# Patient Record
Sex: FEMALE | Race: WHITE | Employment: PART TIME | URBAN - METROPOLITAN AREA
[De-identification: names, ages, dates, MRNs, and addresses within clinical notes are randomized per-mention and may not be internally consistent; named-entity substitution may affect disease eponyms.]

---

## 2017-03-08 ENCOUNTER — OFFICE VISIT (OUTPATIENT)
Dept: ENDOCRINOLOGY | Age: 32
End: 2017-03-08

## 2017-03-08 VITALS
SYSTOLIC BLOOD PRESSURE: 110 MMHG | DIASTOLIC BLOOD PRESSURE: 68 MMHG | HEART RATE: 68 BPM | WEIGHT: 142.6 LBS | HEIGHT: 64 IN | BODY MASS INDEX: 24.34 KG/M2 | OXYGEN SATURATION: 98 %

## 2017-03-08 DIAGNOSIS — R53.82 CHRONIC FATIGUE: ICD-10-CM

## 2017-03-08 DIAGNOSIS — E03.8 HYPOTHYROIDISM DUE TO HASHIMOTO'S THYROIDITIS: Primary | ICD-10-CM

## 2017-03-08 DIAGNOSIS — E06.3 HYPOTHYROIDISM DUE TO HASHIMOTO'S THYROIDITIS: Primary | ICD-10-CM

## 2017-03-08 DIAGNOSIS — G43.009 MIGRAINE WITHOUT AURA AND WITHOUT STATUS MIGRAINOSUS, NOT INTRACTABLE: ICD-10-CM

## 2017-03-08 PROCEDURE — 99213 OFFICE O/P EST LOW 20 MIN: CPT | Performed by: INTERNAL MEDICINE

## 2017-03-08 RX ORDER — LIOTHYRONINE SODIUM 5 UG/1
5 TABLET ORAL DAILY
Qty: 30 TABLET | Refills: 3 | Status: SHIPPED | OUTPATIENT
Start: 2017-03-08 | End: 2017-05-31 | Stop reason: SDUPTHER

## 2017-03-08 ASSESSMENT — PATIENT HEALTH QUESTIONNAIRE - PHQ9
2. FEELING DOWN, DEPRESSED OR HOPELESS: 0
SUM OF ALL RESPONSES TO PHQ9 QUESTIONS 1 & 2: 0
SUM OF ALL RESPONSES TO PHQ QUESTIONS 1-9: 0
1. LITTLE INTEREST OR PLEASURE IN DOING THINGS: 0

## 2017-03-16 DIAGNOSIS — E06.3 HYPOTHYROIDISM DUE TO HASHIMOTO'S THYROIDITIS: ICD-10-CM

## 2017-03-16 DIAGNOSIS — G43.009 MIGRAINE WITHOUT AURA AND WITHOUT STATUS MIGRAINOSUS, NOT INTRACTABLE: ICD-10-CM

## 2017-03-16 DIAGNOSIS — R53.82 CHRONIC FATIGUE: ICD-10-CM

## 2017-03-16 DIAGNOSIS — E03.8 HYPOTHYROIDISM DUE TO HASHIMOTO'S THYROIDITIS: ICD-10-CM

## 2017-03-20 LAB
CORTISOL SALIVARY: 0.04 UG/DL
CORTISOL SALIVARY: 0.06 UG/DL
CORTISOL SALIVARY: 0.13 UG/DL
CORTISOL SALIVARY: 0.23 UG/DL

## 2017-05-04 DIAGNOSIS — R53.82 CHRONIC FATIGUE: ICD-10-CM

## 2017-05-04 DIAGNOSIS — G43.009 MIGRAINE WITHOUT AURA AND WITHOUT STATUS MIGRAINOSUS, NOT INTRACTABLE: ICD-10-CM

## 2017-05-04 DIAGNOSIS — E03.8 HYPOTHYROIDISM DUE TO HASHIMOTO'S THYROIDITIS: ICD-10-CM

## 2017-05-04 DIAGNOSIS — E06.3 HYPOTHYROIDISM DUE TO HASHIMOTO'S THYROIDITIS: ICD-10-CM

## 2017-05-05 LAB
BASOPHILS ABSOLUTE: 0.1 K/UL (ref 0–0.2)
BASOPHILS RELATIVE PERCENT: 0.9 %
EOSINOPHILS ABSOLUTE: 0.1 K/UL (ref 0–0.6)
EOSINOPHILS RELATIVE PERCENT: 1.1 %
HCT VFR BLD CALC: 42.9 % (ref 36–48)
HEMOGLOBIN: 14.2 G/DL (ref 12–16)
HOMOCYSTEINE: 9 UMOL/L (ref 0–10)
LYMPHOCYTES ABSOLUTE: 2.4 K/UL (ref 1–5.1)
LYMPHOCYTES RELATIVE PERCENT: 34 %
MCH RBC QN AUTO: 30.7 PG (ref 26–34)
MCHC RBC AUTO-ENTMCNC: 33.2 G/DL (ref 31–36)
MCV RBC AUTO: 92.4 FL (ref 80–100)
MONOCYTES ABSOLUTE: 0.7 K/UL (ref 0–1.3)
MONOCYTES RELATIVE PERCENT: 9.9 %
NEUTROPHILS ABSOLUTE: 3.9 K/UL (ref 1.7–7.7)
NEUTROPHILS RELATIVE PERCENT: 54.1 %
PDW BLD-RTO: 12.9 % (ref 12.4–15.4)
PLATELET # BLD: 218 K/UL (ref 135–450)
PMV BLD AUTO: 9.3 FL (ref 5–10.5)
RBC # BLD: 4.65 M/UL (ref 4–5.2)
T3 FREE: 3.2 PG/ML (ref 2.3–4.2)
T4 FREE: 1.2 NG/DL (ref 0.9–1.8)
TSH SERPL DL<=0.05 MIU/L-ACNC: 2.42 UIU/ML (ref 0.27–4.2)
VITAMIN D 25-HYDROXY: 39 NG/ML
WBC # BLD: 7.2 K/UL (ref 4–11)

## 2017-05-07 LAB — T3 REVERSE: 8.8 NG/DL (ref 9–27)

## 2017-05-31 ENCOUNTER — OFFICE VISIT (OUTPATIENT)
Dept: ENDOCRINOLOGY | Age: 32
End: 2017-05-31

## 2017-05-31 ENCOUNTER — EMPLOYEE WELLNESS (OUTPATIENT)
Dept: OTHER | Age: 32
End: 2017-05-31

## 2017-05-31 VITALS
SYSTOLIC BLOOD PRESSURE: 122 MMHG | HEART RATE: 77 BPM | OXYGEN SATURATION: 97 % | WEIGHT: 143.6 LBS | HEIGHT: 64 IN | DIASTOLIC BLOOD PRESSURE: 64 MMHG | BODY MASS INDEX: 24.52 KG/M2

## 2017-05-31 DIAGNOSIS — E03.8 HYPOTHYROIDISM DUE TO HASHIMOTO'S THYROIDITIS: Primary | ICD-10-CM

## 2017-05-31 DIAGNOSIS — E06.3 HYPOTHYROIDISM DUE TO HASHIMOTO'S THYROIDITIS: Primary | ICD-10-CM

## 2017-05-31 LAB
CHOLESTEROL, TOTAL: 152 MG/DL (ref 0–199)
GLUCOSE BLD-MCNC: 80 MG/DL (ref 70–99)
HDLC SERPL-MCNC: 69 MG/DL (ref 40–60)
LDL CHOLESTEROL CALCULATED: 73 MG/DL
TRIGL SERPL-MCNC: 50 MG/DL (ref 0–150)

## 2017-05-31 PROCEDURE — 99213 OFFICE O/P EST LOW 20 MIN: CPT | Performed by: INTERNAL MEDICINE

## 2017-05-31 RX ORDER — LEVOTHYROXINE SODIUM 0.07 MG/1
75 TABLET ORAL DAILY
Qty: 90 TABLET | Refills: 2 | Status: SHIPPED | OUTPATIENT
Start: 2017-05-31 | End: 2017-05-31 | Stop reason: SDUPTHER

## 2017-05-31 RX ORDER — LIOTHYRONINE SODIUM 5 UG/1
5 TABLET ORAL DAILY
Qty: 90 TABLET | Refills: 3 | Status: SHIPPED | OUTPATIENT
Start: 2017-05-31 | End: 2018-01-02 | Stop reason: SDUPTHER

## 2017-05-31 RX ORDER — LEVOTHYROXINE SODIUM 75 MCG
75 TABLET ORAL DAILY
Qty: 90 TABLET | Refills: 2 | Status: SHIPPED | OUTPATIENT
Start: 2017-05-31 | End: 2018-01-02 | Stop reason: SDUPTHER

## 2017-06-05 ENCOUNTER — TELEPHONE (OUTPATIENT)
Dept: FAMILY MEDICINE CLINIC | Age: 32
End: 2017-06-05

## 2017-06-05 DIAGNOSIS — Z11.4 SCREENING FOR HIV WITHOUT PRESENCE OF RISK FACTORS: Primary | ICD-10-CM

## 2017-07-07 DIAGNOSIS — E06.3 HYPOTHYROIDISM DUE TO HASHIMOTO'S THYROIDITIS: ICD-10-CM

## 2017-07-07 DIAGNOSIS — E03.8 HYPOTHYROIDISM DUE TO HASHIMOTO'S THYROIDITIS: ICD-10-CM

## 2017-07-07 LAB
T3 FREE: 2.5 PG/ML (ref 2.3–4.2)
T4 FREE: 1.3 NG/DL (ref 0.9–1.8)
TSH SERPL DL<=0.05 MIU/L-ACNC: 0.72 UIU/ML (ref 0.27–4.2)

## 2017-07-10 LAB — T3 REVERSE: 10.7 NG/DL (ref 9–27)

## 2017-08-14 ENCOUNTER — TELEPHONE (OUTPATIENT)
Dept: FAMILY MEDICINE CLINIC | Age: 32
End: 2017-08-14

## 2017-08-21 NOTE — TELEPHONE ENCOUNTER
Call pt  The last routine labs I dod on her were nov 2 2016 -- so it has not yet been a year. IF SHE KNOWS that it is ok with her insurance to get labs done earlier than a year, then she will need a flp, bmp and hiv screen    thanks.

## 2017-09-01 ENCOUNTER — OFFICE VISIT (OUTPATIENT)
Dept: FAMILY MEDICINE CLINIC | Age: 32
End: 2017-09-01

## 2017-09-01 VITALS
BODY MASS INDEX: 25.27 KG/M2 | SYSTOLIC BLOOD PRESSURE: 100 MMHG | TEMPERATURE: 98.8 F | HEART RATE: 62 BPM | WEIGHT: 148 LBS | DIASTOLIC BLOOD PRESSURE: 72 MMHG | HEIGHT: 64 IN

## 2017-09-01 DIAGNOSIS — Z00.00 PREVENTATIVE HEALTH CARE: Primary | ICD-10-CM

## 2017-09-01 DIAGNOSIS — E03.9 ACQUIRED HYPOTHYROIDISM: ICD-10-CM

## 2017-09-01 PROCEDURE — 99395 PREV VISIT EST AGE 18-39: CPT | Performed by: FAMILY MEDICINE

## 2017-09-01 ASSESSMENT — ENCOUNTER SYMPTOMS
WHEEZING: 0
COUGH: 0
TROUBLE SWALLOWING: 0
BACK PAIN: 0
DIARRHEA: 0
SHORTNESS OF BREATH: 0
EYE REDNESS: 0
EYE ITCHING: 0
EYE DISCHARGE: 0
VOMITING: 0
BLOOD IN STOOL: 0
ABDOMINAL PAIN: 0

## 2017-11-06 DIAGNOSIS — E06.3 HYPOTHYROIDISM DUE TO HASHIMOTO'S THYROIDITIS: ICD-10-CM

## 2017-11-06 DIAGNOSIS — E03.8 HYPOTHYROIDISM DUE TO HASHIMOTO'S THYROIDITIS: ICD-10-CM

## 2017-11-06 LAB
T3 FREE: 3 PG/ML (ref 2.3–4.2)
T4 FREE: 1.3 NG/DL (ref 0.9–1.8)
TSH SERPL DL<=0.05 MIU/L-ACNC: 1.1 UIU/ML (ref 0.27–4.2)

## 2017-11-09 LAB — T3 REVERSE: 10.5 NG/DL (ref 9–27)

## 2018-01-02 ENCOUNTER — OFFICE VISIT (OUTPATIENT)
Dept: ENDOCRINOLOGY | Age: 33
End: 2018-01-02

## 2018-01-02 VITALS
SYSTOLIC BLOOD PRESSURE: 122 MMHG | HEIGHT: 64 IN | WEIGHT: 158.8 LBS | HEART RATE: 79 BPM | DIASTOLIC BLOOD PRESSURE: 68 MMHG | BODY MASS INDEX: 27.11 KG/M2 | OXYGEN SATURATION: 98 %

## 2018-01-02 DIAGNOSIS — E03.9 ACQUIRED HYPOTHYROIDISM: ICD-10-CM

## 2018-01-02 DIAGNOSIS — Z87.59 HISTORY OF MISCARRIAGE: ICD-10-CM

## 2018-01-02 DIAGNOSIS — E03.9 ACQUIRED HYPOTHYROIDISM: Primary | ICD-10-CM

## 2018-01-02 PROCEDURE — 99214 OFFICE O/P EST MOD 30 MIN: CPT | Performed by: INTERNAL MEDICINE

## 2018-01-02 RX ORDER — LEVOTHYROXINE SODIUM 75 MCG
75 TABLET ORAL DAILY
Qty: 90 TABLET | Refills: 1 | Status: SHIPPED | OUTPATIENT
Start: 2018-01-02 | End: 2018-01-03 | Stop reason: DRUGHIGH

## 2018-01-02 RX ORDER — LIOTHYRONINE SODIUM 5 MCG
5 TABLET ORAL DAILY
Qty: 90 TABLET | Refills: 1 | Status: SHIPPED | OUTPATIENT
Start: 2018-01-02 | End: 2018-06-26 | Stop reason: ALTCHOICE

## 2018-01-02 ASSESSMENT — PATIENT HEALTH QUESTIONNAIRE - PHQ9
SUM OF ALL RESPONSES TO PHQ9 QUESTIONS 1 & 2: 0
1. LITTLE INTEREST OR PLEASURE IN DOING THINGS: 0
2. FEELING DOWN, DEPRESSED OR HOPELESS: 0
SUM OF ALL RESPONSES TO PHQ QUESTIONS 1-9: 0

## 2018-01-02 NOTE — PROGRESS NOTES
SUBJECTIVE:  Kanwal Anthony is a 29 y.o. female who is here for hypothyroidism. 1. Acquired hypothyroidism      This started in 2015. Patient was diagnosed with hypothyroidism. The problem has been unchanged. Previous thyroid studies include: TSH and free thyroxine. Patient started medication in 2015. Currently patient is on: Synthroid, Cytomel. Misses  0 doses a month. Current complaints: dry skin. History of obstructive symptoms: difficulty swallowing No, changes in voice/hoarseness No.  Had miscarriage in 11/2017. History of radiation to patient's neck: No  Resent iodine exposure: No  Family history includes no thyroid abnormalities. Family history of thyroid cancer: No    2. History of miscarriage  11/2017    ULTRASOUND THYROID GLAND       HISTORY: Goiter, throat pain.       Real time sonographic imaging of the thyroid gland performed.       FINDINGS:       The right lobe measures 4.4 cm and left lobe measures 4.1 cm. The   isthmus is normal at 2 mm.       There are no colloid cysts or nodules present.           Impression   IMPRESSION:        Gland size within normal range.  No cysts or nodules.             Past Medical History:   Diagnosis Date    Acne     Hypothyroidism     Migraines 2000    Uterine fibroid 2012     Patient Active Problem List    Diagnosis Date Noted    Hypothyroidism 11/16/2015    Endometriosis 06/19/2015    Uterine fibroid 06/19/2015    Acne      Past Surgical History:   Procedure Laterality Date    BREAST ENHANCEMENT SURGERY      BREAST SURGERY  2010    DILATION AND CURETTAGE OF UTERUS      x 2 with diag lap    TONSILLECTOMY  2014    WISDOM TOOTH EXTRACTION Bilateral 2003     Family History   Problem Relation Age of Onset    High Cholesterol Mother     High Blood Pressure Father     Cancer Paternal Uncle      esophageal    Diabetes Paternal Grandmother      Social History     Social History    Marital status:      Spouse name: N/A    Number of children:

## 2018-01-03 ENCOUNTER — TELEPHONE (OUTPATIENT)
Dept: ENDOCRINOLOGY | Age: 33
End: 2018-01-03

## 2018-01-03 LAB
A/G RATIO: 1.8 (ref 1.1–2.2)
ALBUMIN SERPL-MCNC: 4.4 G/DL (ref 3.4–5)
ALP BLD-CCNC: 63 U/L (ref 40–129)
ALT SERPL-CCNC: 12 U/L (ref 10–40)
ANION GAP SERPL CALCULATED.3IONS-SCNC: 13 MMOL/L (ref 3–16)
AST SERPL-CCNC: 13 U/L (ref 15–37)
BILIRUB SERPL-MCNC: <0.2 MG/DL (ref 0–1)
BUN BLDV-MCNC: 13 MG/DL (ref 7–20)
CALCIUM SERPL-MCNC: 9.5 MG/DL (ref 8.3–10.6)
CHLORIDE BLD-SCNC: 103 MMOL/L (ref 99–110)
CO2: 27 MMOL/L (ref 21–32)
CREAT SERPL-MCNC: 0.8 MG/DL (ref 0.6–1.1)
GFR AFRICAN AMERICAN: >60
GFR NON-AFRICAN AMERICAN: >60
GLOBULIN: 2.4 G/DL
GLUCOSE BLD-MCNC: 94 MG/DL (ref 70–99)
GONADOTROPIN, CHORIONIC (HCG) QUANT: <5 MIU/ML
POTASSIUM SERPL-SCNC: 4.2 MMOL/L (ref 3.5–5.1)
SODIUM BLD-SCNC: 143 MMOL/L (ref 136–145)
T3 FREE: 2.7 PG/ML (ref 2.3–4.2)
T4 FREE: 1.2 NG/DL (ref 0.9–1.8)
TOTAL PROTEIN: 6.8 G/DL (ref 6.4–8.2)
TSH SERPL DL<=0.05 MIU/L-ACNC: 2.43 UIU/ML (ref 0.27–4.2)

## 2018-01-03 RX ORDER — LEVOTHYROXINE SODIUM 88 MCG
88 TABLET ORAL
Qty: 30 TABLET | Refills: 3 | Status: SHIPPED | OUTPATIENT
Start: 2018-01-03 | End: 2018-01-11 | Stop reason: SDUPTHER

## 2018-01-06 LAB — T3 REVERSE: 19.1 NG/DL (ref 9–27)

## 2018-02-07 DIAGNOSIS — E03.9 ACQUIRED HYPOTHYROIDISM: ICD-10-CM

## 2018-02-07 LAB
T3 FREE: 3.1 PG/ML (ref 2.3–4.2)
T4 FREE: 1.3 NG/DL (ref 0.9–1.8)
TSH SERPL DL<=0.05 MIU/L-ACNC: 0.56 UIU/ML (ref 0.27–4.2)

## 2018-03-20 VITALS — BODY MASS INDEX: 24.05 KG/M2 | WEIGHT: 141 LBS

## 2018-03-30 ENCOUNTER — HOSPITAL ENCOUNTER (OUTPATIENT)
Dept: MRI IMAGING | Age: 33
Discharge: HOME | End: 2018-03-30
Attending: OBSTETRICS & GYNECOLOGY | Admitting: OBSTETRICS & GYNECOLOGY

## 2018-03-30 DIAGNOSIS — N80.102: ICD-10-CM

## 2018-03-30 DIAGNOSIS — N80.109 ENDOMETRIOSIS OF OVARY: ICD-10-CM

## 2018-04-25 DIAGNOSIS — E03.9 ACQUIRED HYPOTHYROIDISM: Primary | ICD-10-CM

## 2018-04-25 RX ORDER — LEVOTHYROXINE SODIUM 88 MCG
88 TABLET ORAL
Qty: 30 TABLET | Refills: 3 | Status: SHIPPED | OUTPATIENT
Start: 2018-04-25 | End: 2018-06-26 | Stop reason: ALTCHOICE

## 2018-05-22 ENCOUNTER — EMPLOYEE WELLNESS (OUTPATIENT)
Dept: OTHER | Age: 33
End: 2018-05-22

## 2018-05-22 LAB
CHOLESTEROL, TOTAL: 138 MG/DL (ref 0–199)
GLUCOSE BLD-MCNC: 83 MG/DL (ref 70–99)
HDLC SERPL-MCNC: 58 MG/DL (ref 40–60)
LDL CHOLESTEROL CALCULATED: 68 MG/DL
TRIGL SERPL-MCNC: 58 MG/DL (ref 0–150)

## 2018-05-29 VITALS — BODY MASS INDEX: 25.06 KG/M2 | WEIGHT: 146 LBS

## 2018-06-26 ENCOUNTER — OFFICE VISIT (OUTPATIENT)
Dept: ENDOCRINOLOGY | Age: 33
End: 2018-06-26

## 2018-06-26 VITALS
HEIGHT: 64 IN | OXYGEN SATURATION: 98 % | BODY MASS INDEX: 24.52 KG/M2 | DIASTOLIC BLOOD PRESSURE: 76 MMHG | HEART RATE: 58 BPM | SYSTOLIC BLOOD PRESSURE: 120 MMHG | WEIGHT: 143.6 LBS

## 2018-06-26 DIAGNOSIS — E03.9 ACQUIRED HYPOTHYROIDISM: Primary | ICD-10-CM

## 2018-06-26 DIAGNOSIS — Z87.59 HISTORY OF MISCARRIAGE: ICD-10-CM

## 2018-06-26 PROCEDURE — 99213 OFFICE O/P EST LOW 20 MIN: CPT | Performed by: INTERNAL MEDICINE

## 2018-06-26 RX ORDER — LEVOTHYROXINE SODIUM 112 MCG
112 TABLET ORAL
Qty: 90 TABLET | Refills: 1 | Status: SHIPPED | OUTPATIENT
Start: 2018-06-26 | End: 2018-09-19 | Stop reason: DRUGHIGH

## 2018-06-26 ASSESSMENT — PATIENT HEALTH QUESTIONNAIRE - PHQ9
1. LITTLE INTEREST OR PLEASURE IN DOING THINGS: 0
SUM OF ALL RESPONSES TO PHQ9 QUESTIONS 1 & 2: 0
SUM OF ALL RESPONSES TO PHQ QUESTIONS 1-9: 0
2. FEELING DOWN, DEPRESSED OR HOPELESS: 0

## 2018-08-13 ENCOUNTER — PATIENT MESSAGE (OUTPATIENT)
Dept: ENDOCRINOLOGY | Age: 33
End: 2018-08-13

## 2018-08-13 DIAGNOSIS — E03.9 ACQUIRED HYPOTHYROIDISM: ICD-10-CM

## 2018-08-13 DIAGNOSIS — E03.9 ACQUIRED HYPOTHYROIDISM: Primary | ICD-10-CM

## 2018-08-13 LAB
A/G RATIO: 1.8 (ref 1.1–2.2)
ALBUMIN SERPL-MCNC: 4.2 G/DL (ref 3.4–5)
ALP BLD-CCNC: 57 U/L (ref 40–129)
ALT SERPL-CCNC: 12 U/L (ref 10–40)
ANION GAP SERPL CALCULATED.3IONS-SCNC: 12 MMOL/L (ref 3–16)
AST SERPL-CCNC: 12 U/L (ref 15–37)
BILIRUB SERPL-MCNC: <0.2 MG/DL (ref 0–1)
BUN BLDV-MCNC: 8 MG/DL (ref 7–20)
CALCIUM SERPL-MCNC: 9.4 MG/DL (ref 8.3–10.6)
CHLORIDE BLD-SCNC: 102 MMOL/L (ref 99–110)
CO2: 23 MMOL/L (ref 21–32)
CREAT SERPL-MCNC: 0.8 MG/DL (ref 0.6–1.1)
GFR AFRICAN AMERICAN: >60
GFR NON-AFRICAN AMERICAN: >60
GLOBULIN: 2.3 G/DL
GLUCOSE BLD-MCNC: 74 MG/DL (ref 70–99)
POTASSIUM SERPL-SCNC: 4.9 MMOL/L (ref 3.5–5.1)
SODIUM BLD-SCNC: 137 MMOL/L (ref 136–145)
T3 FREE: 2.8 PG/ML (ref 2.3–4.2)
T4 FREE: 1.8 NG/DL (ref 0.9–1.8)
TOTAL PROTEIN: 6.5 G/DL (ref 6.4–8.2)
TSH SERPL DL<=0.05 MIU/L-ACNC: 1.34 UIU/ML (ref 0.27–4.2)

## 2018-08-14 PROBLEM — Z34.90 PREGNANCY: Status: ACTIVE | Noted: 2018-08-14

## 2018-08-14 RX ORDER — LEVOTHYROXINE SODIUM 137 UG/1
137 TABLET ORAL DAILY
Qty: 30 TABLET | Refills: 0 | Status: CANCELLED
Start: 2018-08-14

## 2018-08-14 NOTE — TELEPHONE ENCOUNTER
I was not able to leave the message because mailbox was full. Thyroid test is very good. Because of increased requirement during pregnancy for thyroid hormone, increase the dose to 0.137 mg daily. I informed Fransisco Glez to schedule yoav this week when patient calls us back. I ordered new medication, but the dose may change in a month, where does she wasn't us to send Rx?

## 2018-08-14 NOTE — TELEPHONE ENCOUNTER
From: [facility]  To: Gaviota Beckman MD  Sent: 8/13/2018 7:30 PM EDT  Subject: Test Results Question    Hello,  I found out I was pregnant 7/7. Got labs today (8/13). I just wanted to inform you these labs are with me being pregnant. I will call and schedule and apt this week. Thanks!   Cathy Lebron

## 2018-08-15 ENCOUNTER — OFFICE VISIT (OUTPATIENT)
Dept: ENDOCRINOLOGY | Age: 33
End: 2018-08-15

## 2018-08-15 VITALS
DIASTOLIC BLOOD PRESSURE: 56 MMHG | OXYGEN SATURATION: 99 % | BODY MASS INDEX: 24.79 KG/M2 | HEART RATE: 71 BPM | HEIGHT: 64 IN | WEIGHT: 145.2 LBS | SYSTOLIC BLOOD PRESSURE: 102 MMHG

## 2018-08-15 DIAGNOSIS — E03.9 ACQUIRED HYPOTHYROIDISM: Primary | ICD-10-CM

## 2018-08-15 DIAGNOSIS — Z34.90 PREGNANCY, UNSPECIFIED GESTATIONAL AGE: ICD-10-CM

## 2018-08-15 LAB
C. TRACHOMATIS, EXTERNAL RESULT: NEGATIVE
N. GONORRHOEAE, EXTERNAL RESULT: NEGATIVE

## 2018-08-15 PROCEDURE — 99213 OFFICE O/P EST LOW 20 MIN: CPT | Performed by: INTERNAL MEDICINE

## 2018-08-15 RX ORDER — LEVOTHYROXINE SODIUM 137 MCG
137 TABLET ORAL
Qty: 90 TABLET | Refills: 0 | Status: SHIPPED | OUTPATIENT
Start: 2018-08-15 | End: 2018-09-20 | Stop reason: SDUPTHER

## 2018-08-15 ASSESSMENT — PATIENT HEALTH QUESTIONNAIRE - PHQ9
1. LITTLE INTEREST OR PLEASURE IN DOING THINGS: 0
SUM OF ALL RESPONSES TO PHQ QUESTIONS 1-9: 0
SUM OF ALL RESPONSES TO PHQ9 QUESTIONS 1 & 2: 0
SUM OF ALL RESPONSES TO PHQ QUESTIONS 1-9: 0
2. FEELING DOWN, DEPRESSED OR HOPELESS: 0

## 2018-08-15 NOTE — PROGRESS NOTES
SUBJECTIVE:  Elizabeth Hammonds is a 29 y.o. female who is here for hypothyroidism. 1. Acquired hypothyroidism    This started in 2015. Patient was diagnosed with hypothyroidism. The problem has been unchanged. Patient started medication in 2015. Currently patient is on: Synthroid, Cytomel. Misses  0 doses a month. Current complaints: dry skin, fatigue. History of obstructive symptoms: difficulty swallowing No, changes in voice/hoarseness No.  Had miscarriage in 11/2017. History of radiation to patient's neck: No  Resent iodine exposure: No  Family history includes no thyroid abnormalities. Family history of thyroid cancer: No    2. Pregnancy  5 and a half weeks. ULTRASOUND THYROID GLAND       HISTORY: Goiter, throat pain.       Real time sonographic imaging of the thyroid gland performed.       FINDINGS:       The right lobe measures 4.4 cm and left lobe measures 4.1 cm. The   isthmus is normal at 2 mm.       There are no colloid cysts or nodules present.           Impression   IMPRESSION:        Gland size within normal range.  No cysts or nodules.             Past Medical History:   Diagnosis Date    Acne     Hypothyroidism     Migraines 2000    Uterine fibroid 2012     Patient Active Problem List    Diagnosis Date Noted    History of miscarriage 06/26/2018    Hypothyroidism 11/16/2015    Endometriosis 06/19/2015    Uterine fibroid 06/19/2015    Acne      Past Surgical History:   Procedure Laterality Date    BREAST ENHANCEMENT SURGERY      BREAST SURGERY  2010    DILATION AND CURETTAGE OF UTERUS      x 2 with diag lap    TONSILLECTOMY  2014    WISDOM TOOTH EXTRACTION Bilateral 2003     Family History   Problem Relation Age of Onset    High Cholesterol Mother     High Blood Pressure Father     Cancer Paternal Uncle         esophageal    Diabetes Paternal Grandmother      Social History     Social History    Marital status:      Spouse name: N/A    Number of children: N/A    digits and nails are normal  Neurological: normal coordination, normal general cortical function    Lab Review:  Lab Results   Component Value Date    TSH 1.34 08/13/2018     No results found for: FREET4     ASSESSMENT/PLAN:  1. Acquired hypothyroidism  Increase Synthroid to 0.137 mg.  - SYNTHROID 137 MCG tablet; Take 1 tablet by mouth daily    - T4, Free; Future  - TSH without Reflex; Future    2. Pregnancy  - Follow with OB-GYN    Reviewed and/or ordered clinical lab results Yes  Reviewed and/or ordered radiology tests Yes   Reviewed and/or ordered other diagnostic tests No  Discussed test results with performing physician No  Independently reviewed image, tracing, or specimen No  Made a decision to obtain old records No  Reviewed old records Yes  Obtained history from other than patient No    Radha Bales was counseled regarding symptoms of hypothyroidism diagnosis, course and complications of disease if inadequately treated, side effects of medications, diagnosis, treatment options, and prognosis, risks, benefits, complications, and alternatives of treatment, labs, imaging and other studies and treatment targets and goals, thyroid disease in pregnancy. Target TSH 0.5-1.0. She understands instructions and counseling. Total visit time 15 min, >50% was counseling time      No Follow-up on file.

## 2018-08-30 LAB
ABO, EXTERNAL RESULT: NORMAL
HEP B, EXTERNAL RESULT: NEGATIVE
HIV, EXTERNAL RESULT: NORMAL
RHOGAM, EXTERNAL RESULT: POSITIVE
RPR, EXTERNAL RESULT: NEGATIVE
RUBELLA TITER, EXTERNAL RESULT: NORMAL

## 2018-09-04 ENCOUNTER — PATIENT MESSAGE (OUTPATIENT)
Dept: ENDOCRINOLOGY | Age: 33
End: 2018-09-04

## 2018-09-04 NOTE — TELEPHONE ENCOUNTER
Spoke with the patient and she stated that she has been eating more gluten due to the nausea and she wanted the Dr to be aware of that. Labs were done at [address] [facility]. Called Dr. Adilia Thorne office at [phone]. I spoke with Leida Sutton who advised me she could not fax the lab results because they had not been signed off on by Dr. Pravin Teixeira. Leida Sutton stated she would call [facility] and see if she can request a second copy be faxed to us at [phone], if they can not do that then she stated we should be able to get the results first thing tomorrow morning. Lexi offered to give a verbal and I advised her that Dr Dahiana Sue couldn't make any medication adjustments unless she had a hard copy of the labs.

## 2018-09-04 NOTE — TELEPHONE ENCOUNTER
Please obtain lab results. Please let patient know that I recommend to decrease dose to 0.125 mg daily if TSH is 0.34, I just want to get results.

## 2018-09-04 NOTE — TELEPHONE ENCOUNTER
Reviewed the note. Await results. I will let her know if different management. Please let me know when you get results. I spoke with patient. She feels good, no palpitations, insomnia, anxiety. Repeat in 2 weeks and see her during yoav.

## 2018-09-18 DIAGNOSIS — E03.9 ACQUIRED HYPOTHYROIDISM: ICD-10-CM

## 2018-09-18 LAB
A/G RATIO: 1.5 (ref 1.1–2.2)
ALBUMIN SERPL-MCNC: 3.7 G/DL (ref 3.4–5)
ALP BLD-CCNC: 65 U/L (ref 40–129)
ALT SERPL-CCNC: 12 U/L (ref 10–40)
ANION GAP SERPL CALCULATED.3IONS-SCNC: 14 MMOL/L (ref 3–16)
AST SERPL-CCNC: 12 U/L (ref 15–37)
BILIRUB SERPL-MCNC: <0.2 MG/DL (ref 0–1)
BUN BLDV-MCNC: 10 MG/DL (ref 7–20)
CALCIUM SERPL-MCNC: 8.9 MG/DL (ref 8.3–10.6)
CHLORIDE BLD-SCNC: 100 MMOL/L (ref 99–110)
CO2: 22 MMOL/L (ref 21–32)
CREAT SERPL-MCNC: 0.6 MG/DL (ref 0.6–1.1)
GFR AFRICAN AMERICAN: >60
GFR NON-AFRICAN AMERICAN: >60
GLOBULIN: 2.5 G/DL
GLUCOSE BLD-MCNC: 71 MG/DL (ref 70–99)
POTASSIUM SERPL-SCNC: 4.3 MMOL/L (ref 3.5–5.1)
SODIUM BLD-SCNC: 136 MMOL/L (ref 136–145)
T3 FREE: 2.9 PG/ML (ref 2.3–4.2)
T4 FREE: 1.7 NG/DL (ref 0.9–1.8)
TOTAL PROTEIN: 6.2 G/DL (ref 6.4–8.2)
TSH SERPL DL<=0.05 MIU/L-ACNC: 0.24 UIU/ML (ref 0.27–4.2)

## 2018-09-20 ENCOUNTER — OFFICE VISIT (OUTPATIENT)
Dept: ENDOCRINOLOGY | Age: 33
End: 2018-09-20

## 2018-09-20 VITALS
OXYGEN SATURATION: 100 % | WEIGHT: 150.8 LBS | DIASTOLIC BLOOD PRESSURE: 62 MMHG | SYSTOLIC BLOOD PRESSURE: 103 MMHG | BODY MASS INDEX: 25.88 KG/M2 | HEART RATE: 67 BPM

## 2018-09-20 DIAGNOSIS — Z34.90 PREGNANCY, UNSPECIFIED GESTATIONAL AGE: ICD-10-CM

## 2018-09-20 DIAGNOSIS — E03.9 ACQUIRED HYPOTHYROIDISM: Primary | ICD-10-CM

## 2018-09-20 PROCEDURE — 99213 OFFICE O/P EST LOW 20 MIN: CPT | Performed by: INTERNAL MEDICINE

## 2018-09-20 RX ORDER — LEVOTHYROXINE SODIUM 137 MCG
TABLET ORAL
Qty: 90 TABLET | Refills: 1
Start: 2018-09-20 | End: 2018-11-01 | Stop reason: SDUPTHER

## 2018-09-20 NOTE — PROGRESS NOTES
SUBJECTIVE:  Francisco Yoon is a 29 y.o. female who is here for hypothyroidism. 1. Acquired hypothyroidism    This started in 2015. Patient was diagnosed with hypothyroidism. The problem has been unchanged. Patient started medication in 2015. Currently patient is on: Synthroid. Misses  0 doses a month. Current complaints: dry skin, fatigue, nausea, occasional vomiting. Shalonda Escalante History of obstructive symptoms: difficulty swallowing No, changes in voice/hoarseness No.  Had miscarriage in 11/2017. History of radiation to patient's neck: No  Resent iodine exposure: No  Family history includes no thyroid abnormalities. Family history of thyroid cancer: No    2. Pregnancy  11 and a half weeks. 1st pregnancy. ULTRASOUND THYROID GLAND       HISTORY: Goiter, throat pain.       Real time sonographic imaging of the thyroid gland performed.       FINDINGS:       The right lobe measures 4.4 cm and left lobe measures 4.1 cm. The   isthmus is normal at 2 mm.       There are no colloid cysts or nodules present.           Impression   IMPRESSION:        Gland size within normal range.  No cysts or nodules.             Past Medical History:   Diagnosis Date    Acne     Hypothyroidism     Migraines 2000    Uterine fibroid 2012     Patient Active Problem List    Diagnosis Date Noted    Pregnancy 08/14/2018    History of miscarriage 06/26/2018    Hypothyroidism 11/16/2015    Endometriosis 06/19/2015    Uterine fibroid 06/19/2015    Acne      Past Surgical History:   Procedure Laterality Date    BREAST ENHANCEMENT SURGERY      BREAST SURGERY  2010    DILATION AND CURETTAGE OF UTERUS      x 2 with diag lap    TONSILLECTOMY  2014    WISDOM TOOTH EXTRACTION Bilateral 2003     Family History   Problem Relation Age of Onset    High Cholesterol Mother     High Blood Pressure Father     Cancer Paternal Uncle         esophageal    Diabetes Paternal Grandmother      Social History     Social History    Marital pain  Integument/Breast: no hair loss, no skin rashes, no skin lesions, no itching, has dry skin, no breast pain, no breast mass, no skin hives, no skin discoloration, no nipple discharge  Neurological: no numbness, no tingling, no weakness, no confusion, no headaches, no dizziness, no fainting, no tremors, no decrease in memory, no balance problems  Psychiatric: no anxiety, no depression, no insomnia  Hematologic/Lymphatic: no tendency for easy bleeding, no swollen lymph nodes, no tendency for easy bruising  Immunology: no seasonal allergies, no frequent infections, no frequent illnesses  Endocrine: no temperature intolerance, no hot flashes, no hand tremor    OBJECTIVE:   /62   Pulse 67   Wt 150 lb 12.8 oz (68.4 kg)   LMP 07/09/2018   SpO2 100%   BMI 25.88 kg/m²   Wt Readings from Last 3 Encounters:   09/20/18 150 lb 12.8 oz (68.4 kg)   08/15/18 145 lb 3.2 oz (65.9 kg)   06/26/18 143 lb 9.6 oz (65.1 kg)       Physical Exam:  Constitutional: no acute distress, well appearing, well nourished  Psychiatric: oriented to person, place and time, judgement, insight and normal, recent and remote memory and intact and mood, affect are normal  Skin: skin and subcutaneous tissue is normal without mass, normal turgor  Head and Face: examination of head and face revealed no abnormalities  Eyes: no lid or conjunctival swelling, no erythema or discharge, pupils are normal, equal, round, and reactive to light  Ears/Nose: external inspection of ears and nose revealed no abnormalities, hearing is grossly normal  Oropharynx/Mouth/Face: lips, tongue and gums are normal with no lesions, the voice quality was normal  Neck: neck is supple and symmetric, with midline trachea and no masses, thyroid is normal  Lymphatics: normal cervical lymph nodes, normal supraclavicular nodes  Pulmonary: no increased work of breathing or signs of respiratory distress, lungs are clear to auscultation  Cardiovascular: normal heart rate and

## 2018-10-25 DIAGNOSIS — E03.9 ACQUIRED HYPOTHYROIDISM: ICD-10-CM

## 2018-10-25 LAB
T3 FREE: 3 PG/ML (ref 2.3–4.2)
T4 FREE: 1.7 NG/DL (ref 0.9–1.8)
TSH SERPL DL<=0.05 MIU/L-ACNC: 0.42 UIU/ML (ref 0.27–4.2)

## 2018-11-01 ENCOUNTER — OFFICE VISIT (OUTPATIENT)
Dept: ENDOCRINOLOGY | Age: 33
End: 2018-11-01
Payer: COMMERCIAL

## 2018-11-01 VITALS
HEART RATE: 79 BPM | DIASTOLIC BLOOD PRESSURE: 61 MMHG | HEIGHT: 64 IN | SYSTOLIC BLOOD PRESSURE: 98 MMHG | BODY MASS INDEX: 28.13 KG/M2 | OXYGEN SATURATION: 100 % | WEIGHT: 164.8 LBS

## 2018-11-01 DIAGNOSIS — E03.9 ACQUIRED HYPOTHYROIDISM: Primary | ICD-10-CM

## 2018-11-01 DIAGNOSIS — Z34.90 PREGNANCY, UNSPECIFIED GESTATIONAL AGE: ICD-10-CM

## 2018-11-01 PROCEDURE — 99213 OFFICE O/P EST LOW 20 MIN: CPT | Performed by: INTERNAL MEDICINE

## 2018-11-01 RX ORDER — LEVOTHYROXINE SODIUM 137 MCG
TABLET ORAL
Qty: 90 TABLET | Refills: 1 | Status: SHIPPED | OUTPATIENT
Start: 2018-11-01 | End: 2019-01-16 | Stop reason: SDUPTHER

## 2018-11-01 NOTE — PROGRESS NOTES
has dry skin, no breast pain, no breast mass, no skin hives, no skin discoloration, no nipple discharge  Neurological: no numbness, no tingling, no weakness, no confusion, no headaches, no dizziness, no fainting, no tremors, no decrease in memory, no balance problems  Psychiatric: no anxiety, no depression, no insomnia  Hematologic/Lymphatic: no tendency for easy bleeding, no swollen lymph nodes, no tendency for easy bruising  Immunology: no seasonal allergies, no frequent infections, no frequent illnesses  Endocrine: no temperature intolerance, no hot flashes, no hand tremor    OBJECTIVE:   BP 98/61 (Site: Left Upper Arm, Position: Sitting, Cuff Size: Medium Adult)   Pulse 79   Ht 5' 4\" (1.626 m)   Wt 164 lb 12.8 oz (74.8 kg)   LMP 07/09/2018   SpO2 100%   BMI 28.29 kg/m²   Wt Readings from Last 3 Encounters:   11/01/18 164 lb 12.8 oz (74.8 kg)   09/20/18 150 lb 12.8 oz (68.4 kg)   08/15/18 145 lb 3.2 oz (65.9 kg)       Physical Exam:  Constitutional: no acute distress, well appearing, well nourished  Psychiatric: oriented to person, place and time, judgement, insight and normal, recent and remote memory and intact and mood, affect are normal  Skin: skin and subcutaneous tissue is normal without mass, normal turgor  Head and Face: examination of head and face revealed no abnormalities  Eyes: no lid or conjunctival swelling, no erythema or discharge, pupils are normal, equal, round, and reactive to light  Ears/Nose: external inspection of ears and nose revealed no abnormalities, hearing is grossly normal  Oropharynx/Mouth/Face: lips, tongue and gums are normal with no lesions, the voice quality was normal  Neck: neck is supple and symmetric, with midline trachea and no masses, thyroid is normal  Lymphatics: normal cervical lymph nodes, normal supraclavicular nodes  Pulmonary: no increased work of breathing or signs of respiratory distress, lungs are clear to auscultation  Cardiovascular: normal heart rate and

## 2019-01-14 DIAGNOSIS — E03.9 ACQUIRED HYPOTHYROIDISM: ICD-10-CM

## 2019-01-14 LAB
T4 FREE: 1.5 NG/DL (ref 0.9–1.8)
TSH SERPL DL<=0.05 MIU/L-ACNC: 1.68 UIU/ML (ref 0.27–4.2)

## 2019-01-16 ENCOUNTER — OFFICE VISIT (OUTPATIENT)
Dept: ENDOCRINOLOGY | Age: 34
End: 2019-01-16
Payer: COMMERCIAL

## 2019-01-16 VITALS
HEART RATE: 76 BPM | BODY MASS INDEX: 30.15 KG/M2 | HEIGHT: 64 IN | WEIGHT: 176.6 LBS | DIASTOLIC BLOOD PRESSURE: 64 MMHG | SYSTOLIC BLOOD PRESSURE: 114 MMHG

## 2019-01-16 DIAGNOSIS — Z34.90 PREGNANCY, UNSPECIFIED GESTATIONAL AGE: ICD-10-CM

## 2019-01-16 DIAGNOSIS — E03.9 ACQUIRED HYPOTHYROIDISM: Primary | ICD-10-CM

## 2019-01-16 PROCEDURE — 99213 OFFICE O/P EST LOW 20 MIN: CPT | Performed by: INTERNAL MEDICINE

## 2019-01-16 RX ORDER — LEVOTHYROXINE SODIUM 137 MCG
TABLET ORAL
Qty: 90 TABLET | Refills: 1 | Status: SHIPPED | OUTPATIENT
Start: 2019-01-16 | End: 2019-05-15 | Stop reason: ALTCHOICE

## 2019-02-26 ENCOUNTER — TELEPHONE (OUTPATIENT)
Dept: FAMILY MEDICINE CLINIC | Age: 34
End: 2019-02-26

## 2019-02-28 ENCOUNTER — NURSE TRIAGE (OUTPATIENT)
Dept: OTHER | Facility: CLINIC | Age: 34
End: 2019-02-28

## 2019-03-15 ENCOUNTER — HOSPITAL ENCOUNTER (EMERGENCY)
Age: 34
Discharge: HOME | End: 2019-03-15
Payer: COMMERCIAL

## 2019-03-15 VITALS
HEIGHT: 65 IN | RESPIRATION RATE: 16 BRPM | SYSTOLIC BLOOD PRESSURE: 108 MMHG | BODY MASS INDEX: 29.39 KG/M2 | OXYGEN SATURATION: 99 % | DIASTOLIC BLOOD PRESSURE: 68 MMHG | TEMPERATURE: 97.5 F | HEART RATE: 79 BPM

## 2019-03-15 DIAGNOSIS — S61.411A LACERATION OF RIGHT HAND WITHOUT FOREIGN BODY, INITIAL ENCOUNTER: Primary | ICD-10-CM

## 2019-03-15 PROCEDURE — 2500000003 HC RX 250 WO HCPCS: Performed by: PHYSICIAN ASSISTANT

## 2019-03-15 PROCEDURE — 4500000022 HC ED LEVEL 2 PROCEDURE

## 2019-03-15 PROCEDURE — 99282 EMERGENCY DEPT VISIT SF MDM: CPT

## 2019-03-15 RX ADMIN — LIDOCAINE HYDROCHLORIDE 5 ML: 10 INJECTION, SOLUTION EPIDURAL; INFILTRATION; INTRACAUDAL; PERINEURAL at 14:34

## 2019-03-15 ASSESSMENT — PAIN DESCRIPTION - ORIENTATION: ORIENTATION: RIGHT

## 2019-03-15 ASSESSMENT — PAIN DESCRIPTION - FREQUENCY: FREQUENCY: CONTINUOUS

## 2019-03-15 ASSESSMENT — PAIN SCALES - GENERAL
PAINLEVEL_OUTOF10: 3
PAINLEVEL_OUTOF10: 3
PAINLEVEL_OUTOF10: 0

## 2019-03-15 ASSESSMENT — PAIN DESCRIPTION - LOCATION: LOCATION: HAND

## 2019-03-15 ASSESSMENT — PAIN DESCRIPTION - PAIN TYPE: TYPE: ACUTE PAIN

## 2019-03-15 ASSESSMENT — PAIN DESCRIPTION - DESCRIPTORS: DESCRIPTORS: ACHING

## 2019-03-15 ASSESSMENT — ENCOUNTER SYMPTOMS
BACK PAIN: 0
NAUSEA: 0

## 2019-03-19 LAB — GBS, EXTERNAL RESULT: NORMAL

## 2019-03-20 DIAGNOSIS — E03.9 ACQUIRED HYPOTHYROIDISM: ICD-10-CM

## 2019-03-20 LAB
T4 FREE: 1.2 NG/DL (ref 0.9–1.8)
TSH SERPL DL<=0.05 MIU/L-ACNC: 0.5 UIU/ML (ref 0.27–4.2)

## 2019-04-08 ENCOUNTER — TELEPHONE (OUTPATIENT)
Dept: ENDOCRINOLOGY | Age: 34
End: 2019-04-08

## 2019-04-08 DIAGNOSIS — E03.9 ACQUIRED HYPOTHYROIDISM: ICD-10-CM

## 2019-04-08 RX ORDER — LEVOTHYROXINE SODIUM 88 MCG
88 TABLET ORAL
Qty: 90 TABLET | Refills: 0 | Status: SHIPPED | OUTPATIENT
Start: 2019-04-08 | End: 2019-07-22 | Stop reason: SDUPTHER

## 2019-04-08 RX ORDER — LIOTHYRONINE SODIUM 5 MCG
5 TABLET ORAL DAILY
Qty: 90 TABLET | Refills: 0 | Status: SHIPPED | OUTPATIENT
Start: 2019-04-08 | End: 2019-07-22 | Stop reason: SDUPTHER

## 2019-04-08 NOTE — TELEPHONE ENCOUNTER
I spoke with patient. She will have baby delivered in 2 days. I will sent prescription to pharmacy for her previous prior to pregnancy doses of Cytomel 5 µg daily and Synthroid 88 µg daily. Patient will start taking it after delivery. I also informed her to reschedule her appointment for approximately 5 weeks after delivery which would to be mid of May.  let me know if any questions.

## 2019-04-08 NOTE — TELEPHONE ENCOUNTER
Spoke with patient and she wanted to know if she would be ok to be seen after she delivers her baby. Patient states that she has an appointment with her OB the same day as her appointment with Dr. Ayaz Reagan and can't make it to her appointment. Patient was advised that it was ok to be seen after she delivers and that she would need to call the office to let us know that she has delivered so that Dr. Ayaz Reagan can change her medication dose.

## 2019-04-12 ENCOUNTER — HOSPITAL ENCOUNTER (INPATIENT)
Age: 34
LOS: 3 days | Discharge: HOME | End: 2019-04-15
Attending: OBSTETRICS & GYNECOLOGY | Admitting: OBSTETRICS & GYNECOLOGY
Payer: COMMERCIAL

## 2019-04-12 ENCOUNTER — ANESTHESIA EVENT (OUTPATIENT)
Dept: LABOR AND DELIVERY | Age: 34
End: 2019-04-12
Payer: COMMERCIAL

## 2019-04-12 ENCOUNTER — ANESTHESIA (OUTPATIENT)
Dept: LABOR AND DELIVERY | Age: 34
End: 2019-04-12
Payer: COMMERCIAL

## 2019-04-12 VITALS
DIASTOLIC BLOOD PRESSURE: 59 MMHG | RESPIRATION RATE: 20 BRPM | SYSTOLIC BLOOD PRESSURE: 103 MMHG | OXYGEN SATURATION: 98 %

## 2019-04-12 DIAGNOSIS — Z98.891 S/P CESAREAN SECTION: Primary | ICD-10-CM

## 2019-04-12 DIAGNOSIS — G89.18 POST-OP PAIN: ICD-10-CM

## 2019-04-12 PROBLEM — Z34.90 TERM PREGNANCY: Status: ACTIVE | Noted: 2019-04-12

## 2019-04-12 PROBLEM — Q51.3 BICORNUATE UTERUS: Status: ACTIVE | Noted: 2019-04-12

## 2019-04-12 LAB
ABO/RH: NORMAL
AMPHETAMINE SCREEN, URINE: NORMAL
ANTIBODY SCREEN: NORMAL
BARBITURATE SCREEN URINE: NORMAL
BENZODIAZEPINE SCREEN, URINE: NORMAL
BUPRENORPHINE URINE: NORMAL
CANNABINOID SCREEN URINE: NORMAL
COCAINE METABOLITE SCREEN URINE: NORMAL
HCT VFR BLD CALC: 33.1 % (ref 36–48)
HEMOGLOBIN: 11.2 G/DL (ref 12–16)
Lab: NORMAL
MCH RBC QN AUTO: 29.7 PG (ref 26–34)
MCHC RBC AUTO-ENTMCNC: 33.9 G/DL (ref 31–36)
MCV RBC AUTO: 87.5 FL (ref 80–100)
METHADONE SCREEN, URINE: NORMAL
OPIATE SCREEN URINE: NORMAL
OXYCODONE URINE: NORMAL
PDW BLD-RTO: 14.2 % (ref 12.4–15.4)
PH UA: 6
PHENCYCLIDINE SCREEN URINE: NORMAL
PLATELET # BLD: 267 K/UL (ref 135–450)
PMV BLD AUTO: 8.7 FL (ref 5–10.5)
PROPOXYPHENE SCREEN: NORMAL
RBC # BLD: 3.78 M/UL (ref 4–5.2)
TOTAL SYPHILLIS IGG/IGM: NORMAL
WBC # BLD: 9.2 K/UL (ref 4–11)

## 2019-04-12 PROCEDURE — 2500000003 HC RX 250 WO HCPCS: Performed by: NURSE ANESTHETIST, CERTIFIED REGISTERED

## 2019-04-12 PROCEDURE — 6360000002 HC RX W HCPCS: Performed by: OBSTETRICS & GYNECOLOGY

## 2019-04-12 PROCEDURE — 86901 BLOOD TYPING SEROLOGIC RH(D): CPT

## 2019-04-12 PROCEDURE — 80307 DRUG TEST PRSMV CHEM ANLYZR: CPT

## 2019-04-12 PROCEDURE — 85027 COMPLETE CBC AUTOMATED: CPT

## 2019-04-12 PROCEDURE — 6360000002 HC RX W HCPCS: Performed by: NURSE ANESTHETIST, CERTIFIED REGISTERED

## 2019-04-12 PROCEDURE — 2500000003 HC RX 250 WO HCPCS: Performed by: OBSTETRICS & GYNECOLOGY

## 2019-04-12 PROCEDURE — 86780 TREPONEMA PALLIDUM: CPT

## 2019-04-12 PROCEDURE — 2709999900 HC NON-CHARGEABLE SUPPLY: Performed by: OBSTETRICS & GYNECOLOGY

## 2019-04-12 PROCEDURE — 36415 COLL VENOUS BLD VENIPUNCTURE: CPT

## 2019-04-12 PROCEDURE — 86923 COMPATIBILITY TEST ELECTRIC: CPT

## 2019-04-12 PROCEDURE — 86900 BLOOD TYPING SEROLOGIC ABO: CPT

## 2019-04-12 PROCEDURE — 2580000003 HC RX 258: Performed by: OBSTETRICS & GYNECOLOGY

## 2019-04-12 PROCEDURE — 6360000002 HC RX W HCPCS: Performed by: ANESTHESIOLOGY

## 2019-04-12 PROCEDURE — 7100000001 HC PACU RECOVERY - ADDTL 15 MIN: Performed by: OBSTETRICS & GYNECOLOGY

## 2019-04-12 PROCEDURE — 3700000000 HC ANESTHESIA ATTENDED CARE: Performed by: OBSTETRICS & GYNECOLOGY

## 2019-04-12 PROCEDURE — 4A1HXCZ MONITORING OF PRODUCTS OF CONCEPTION, CARDIAC RATE, EXTERNAL APPROACH: ICD-10-PCS | Performed by: OBSTETRICS & GYNECOLOGY

## 2019-04-12 PROCEDURE — 59025 FETAL NON-STRESS TEST: CPT

## 2019-04-12 PROCEDURE — 86850 RBC ANTIBODY SCREEN: CPT

## 2019-04-12 PROCEDURE — 3700000001 HC ADD 15 MINUTES (ANESTHESIA): Performed by: OBSTETRICS & GYNECOLOGY

## 2019-04-12 PROCEDURE — 6370000000 HC RX 637 (ALT 250 FOR IP): Performed by: OBSTETRICS & GYNECOLOGY

## 2019-04-12 PROCEDURE — 3609079900 HC CESAREAN SECTION: Performed by: OBSTETRICS & GYNECOLOGY

## 2019-04-12 PROCEDURE — 1220000000 HC SEMI PRIVATE OB R&B

## 2019-04-12 PROCEDURE — 7100000000 HC PACU RECOVERY - FIRST 15 MIN: Performed by: OBSTETRICS & GYNECOLOGY

## 2019-04-12 RX ORDER — SODIUM CHLORIDE 0.9 % (FLUSH) 0.9 %
10 SYRINGE (ML) INJECTION EVERY 12 HOURS SCHEDULED
Status: DISCONTINUED | OUTPATIENT
Start: 2019-04-12 | End: 2019-04-15 | Stop reason: HOSPADM

## 2019-04-12 RX ORDER — LIDOCAINE HYDROCHLORIDE 10 MG/ML
INJECTION, SOLUTION INFILTRATION; PERINEURAL PRN
Status: DISCONTINUED | OUTPATIENT
Start: 2019-04-12 | End: 2019-04-12 | Stop reason: SDUPTHER

## 2019-04-12 RX ORDER — NALOXONE HYDROCHLORIDE 0.4 MG/ML
0.4 INJECTION, SOLUTION INTRAMUSCULAR; INTRAVENOUS; SUBCUTANEOUS PRN
Status: DISCONTINUED | OUTPATIENT
Start: 2019-04-12 | End: 2019-04-15 | Stop reason: HOSPADM

## 2019-04-12 RX ORDER — METHYLERGONOVINE MALEATE 0.2 MG/ML
INJECTION INTRAVENOUS
Status: DISPENSED
Start: 2019-04-12 | End: 2019-04-12

## 2019-04-12 RX ORDER — PRENATAL VIT/IRON FUM/FOLIC AC 27MG-0.8MG
1 TABLET ORAL DAILY
Status: DISCONTINUED | OUTPATIENT
Start: 2019-04-13 | End: 2019-04-15 | Stop reason: HOSPADM

## 2019-04-12 RX ORDER — ONDANSETRON 2 MG/ML
4 INJECTION INTRAMUSCULAR; INTRAVENOUS EVERY 6 HOURS PRN
Status: DISCONTINUED | OUTPATIENT
Start: 2019-04-12 | End: 2019-04-12 | Stop reason: HOSPADM

## 2019-04-12 RX ORDER — KETOROLAC TROMETHAMINE 30 MG/ML
30 INJECTION, SOLUTION INTRAMUSCULAR; INTRAVENOUS EVERY 6 HOURS
Status: DISPENSED | OUTPATIENT
Start: 2019-04-12 | End: 2019-04-13

## 2019-04-12 RX ORDER — SCOLOPAMINE TRANSDERMAL SYSTEM 1 MG/1
1 PATCH, EXTENDED RELEASE TRANSDERMAL ONCE
Status: DISCONTINUED | OUTPATIENT
Start: 2019-04-12 | End: 2019-04-15 | Stop reason: HOSPADM

## 2019-04-12 RX ORDER — LEVOTHYROXINE SODIUM 88 UG/1
88 TABLET ORAL DAILY
Status: DISCONTINUED | OUTPATIENT
Start: 2019-04-12 | End: 2019-04-15 | Stop reason: HOSPADM

## 2019-04-12 RX ORDER — ACETAMINOPHEN 10 MG/ML
1000 INJECTION, SOLUTION INTRAVENOUS EVERY 6 HOURS PRN
Status: DISCONTINUED | OUTPATIENT
Start: 2019-04-12 | End: 2019-04-15 | Stop reason: HOSPADM

## 2019-04-12 RX ORDER — KETOROLAC TROMETHAMINE 30 MG/ML
INJECTION, SOLUTION INTRAMUSCULAR; INTRAVENOUS PRN
Status: DISCONTINUED | OUTPATIENT
Start: 2019-04-12 | End: 2019-04-12 | Stop reason: SDUPTHER

## 2019-04-12 RX ORDER — ONDANSETRON 2 MG/ML
4 INJECTION INTRAMUSCULAR; INTRAVENOUS EVERY 6 HOURS PRN
Status: DISCONTINUED | OUTPATIENT
Start: 2019-04-12 | End: 2019-04-15 | Stop reason: HOSPADM

## 2019-04-12 RX ORDER — MORPHINE SULFATE 1 MG/ML
INJECTION, SOLUTION EPIDURAL; INTRATHECAL; INTRAVENOUS PRN
Status: DISCONTINUED | OUTPATIENT
Start: 2019-04-12 | End: 2019-04-12 | Stop reason: SDUPTHER

## 2019-04-12 RX ORDER — SIMETHICONE 80 MG
80 TABLET,CHEWABLE ORAL EVERY 6 HOURS PRN
Status: DISCONTINUED | OUTPATIENT
Start: 2019-04-12 | End: 2019-04-15 | Stop reason: HOSPADM

## 2019-04-12 RX ORDER — OXYTOCIN 10 [USP'U]/ML
INJECTION, SOLUTION INTRAMUSCULAR; INTRAVENOUS PRN
Status: DISCONTINUED | OUTPATIENT
Start: 2019-04-12 | End: 2019-04-12 | Stop reason: SDUPTHER

## 2019-04-12 RX ORDER — BUPIVACAINE HYDROCHLORIDE 7.5 MG/ML
INJECTION, SOLUTION INTRASPINAL PRN
Status: DISCONTINUED | OUTPATIENT
Start: 2019-04-12 | End: 2019-04-12 | Stop reason: SDUPTHER

## 2019-04-12 RX ORDER — FENTANYL CITRATE 50 UG/ML
INJECTION, SOLUTION INTRAMUSCULAR; INTRAVENOUS PRN
Status: DISCONTINUED | OUTPATIENT
Start: 2019-04-12 | End: 2019-04-12 | Stop reason: SDUPTHER

## 2019-04-12 RX ORDER — NICOTINE 21 MG/24HR
1 PATCH, TRANSDERMAL 24 HOURS TRANSDERMAL DAILY
Status: DISCONTINUED | OUTPATIENT
Start: 2019-04-12 | End: 2019-04-12

## 2019-04-12 RX ORDER — HYDROCODONE BITARTRATE AND ACETAMINOPHEN 5; 325 MG/1; MG/1
1 TABLET ORAL EVERY 4 HOURS PRN
Status: DISCONTINUED | OUTPATIENT
Start: 2019-04-12 | End: 2019-04-14 | Stop reason: SDUPTHER

## 2019-04-12 RX ORDER — SODIUM CHLORIDE 0.9 % (FLUSH) 0.9 %
10 SYRINGE (ML) INJECTION PRN
Status: DISCONTINUED | OUTPATIENT
Start: 2019-04-12 | End: 2019-04-12 | Stop reason: HOSPADM

## 2019-04-12 RX ORDER — EPHEDRINE SULFATE/0.9% NACL/PF 50 MG/5 ML
SYRINGE (ML) INTRAVENOUS PRN
Status: DISCONTINUED | OUTPATIENT
Start: 2019-04-12 | End: 2019-04-12 | Stop reason: SDUPTHER

## 2019-04-12 RX ORDER — PHENYLEPHRINE HCL IN 0.9% NACL 1 MG/10 ML
SYRINGE (ML) INTRAVENOUS PRN
Status: DISCONTINUED | OUTPATIENT
Start: 2019-04-12 | End: 2019-04-12 | Stop reason: SDUPTHER

## 2019-04-12 RX ORDER — DOCUSATE SODIUM 100 MG/1
100 CAPSULE, LIQUID FILLED ORAL 2 TIMES DAILY
Status: DISCONTINUED | OUTPATIENT
Start: 2019-04-12 | End: 2019-04-15 | Stop reason: HOSPADM

## 2019-04-12 RX ORDER — NALBUPHINE HCL 10 MG/ML
5 AMPUL (ML) INJECTION
Status: ACTIVE | OUTPATIENT
Start: 2019-04-12 | End: 2019-04-13

## 2019-04-12 RX ORDER — SODIUM CHLORIDE, SODIUM LACTATE, POTASSIUM CHLORIDE, CALCIUM CHLORIDE 600; 310; 30; 20 MG/100ML; MG/100ML; MG/100ML; MG/100ML
INJECTION, SOLUTION INTRAVENOUS CONTINUOUS
Status: DISCONTINUED | OUTPATIENT
Start: 2019-04-12 | End: 2019-04-15 | Stop reason: HOSPADM

## 2019-04-12 RX ORDER — SODIUM CHLORIDE 0.9 % (FLUSH) 0.9 %
10 SYRINGE (ML) INJECTION PRN
Status: DISCONTINUED | OUTPATIENT
Start: 2019-04-12 | End: 2019-04-15 | Stop reason: HOSPADM

## 2019-04-12 RX ORDER — EPHEDRINE SULFATE 50 MG/ML
INJECTION INTRAVENOUS PRN
Status: DISCONTINUED | OUTPATIENT
Start: 2019-04-12 | End: 2019-04-12

## 2019-04-12 RX ORDER — DEXAMETHASONE SODIUM PHOSPHATE 4 MG/ML
INJECTION, SOLUTION INTRA-ARTICULAR; INTRALESIONAL; INTRAMUSCULAR; INTRAVENOUS; SOFT TISSUE PRN
Status: DISCONTINUED | OUTPATIENT
Start: 2019-04-12 | End: 2019-04-12 | Stop reason: SDUPTHER

## 2019-04-12 RX ORDER — MORPHINE SULFATE 10 MG/ML
INJECTION, SOLUTION INTRAMUSCULAR; INTRAVENOUS PRN
Status: DISCONTINUED | OUTPATIENT
Start: 2019-04-12 | End: 2019-04-12 | Stop reason: SDUPTHER

## 2019-04-12 RX ORDER — LANOLIN 100 %
OINTMENT (GRAM) TOPICAL
Status: DISCONTINUED | OUTPATIENT
Start: 2019-04-12 | End: 2019-04-15 | Stop reason: HOSPADM

## 2019-04-12 RX ORDER — METHYLERGONOVINE MALEATE 0.2 MG/ML
200 INJECTION INTRAVENOUS PRN
Status: DISCONTINUED | OUTPATIENT
Start: 2019-04-12 | End: 2019-04-15 | Stop reason: HOSPADM

## 2019-04-12 RX ORDER — MIDAZOLAM HYDROCHLORIDE 1 MG/ML
INJECTION INTRAMUSCULAR; INTRAVENOUS PRN
Status: DISCONTINUED | OUTPATIENT
Start: 2019-04-12 | End: 2019-04-12 | Stop reason: SDUPTHER

## 2019-04-12 RX ORDER — ACETAMINOPHEN 325 MG/1
650 TABLET ORAL EVERY 6 HOURS PRN
Status: DISCONTINUED | OUTPATIENT
Start: 2019-04-12 | End: 2019-04-15 | Stop reason: HOSPADM

## 2019-04-12 RX ORDER — IBUPROFEN 400 MG/1
800 TABLET ORAL EVERY 8 HOURS PRN
Status: DISCONTINUED | OUTPATIENT
Start: 2019-04-13 | End: 2019-04-15 | Stop reason: HOSPADM

## 2019-04-12 RX ORDER — KETOROLAC TROMETHAMINE 30 MG/ML
30 INJECTION, SOLUTION INTRAMUSCULAR; INTRAVENOUS EVERY 6 HOURS
Status: ACTIVE | OUTPATIENT
Start: 2019-04-12 | End: 2019-04-13

## 2019-04-12 RX ORDER — METOCLOPRAMIDE HYDROCHLORIDE 5 MG/ML
10 INJECTION INTRAMUSCULAR; INTRAVENOUS ONCE
Status: COMPLETED | OUTPATIENT
Start: 2019-04-12 | End: 2019-04-12

## 2019-04-12 RX ADMIN — Medication 10 MG: at 11:53

## 2019-04-12 RX ADMIN — MORPHINE SULFATE 0.2 MG: 1 INJECTION, SOLUTION EPIDURAL; INTRATHECAL; INTRAVENOUS at 11:31

## 2019-04-12 RX ADMIN — Medication 2 G: at 11:29

## 2019-04-12 RX ADMIN — SODIUM CHLORIDE, POTASSIUM CHLORIDE, SODIUM LACTATE AND CALCIUM CHLORIDE: 600; 310; 30; 20 INJECTION, SOLUTION INTRAVENOUS at 21:22

## 2019-04-12 RX ADMIN — KETOROLAC TROMETHAMINE 30 MG: 30 INJECTION, SOLUTION INTRAMUSCULAR; INTRAVENOUS at 18:12

## 2019-04-12 RX ADMIN — ACETAMINOPHEN 1000 MG: 10 INJECTION, SOLUTION INTRAVENOUS at 14:39

## 2019-04-12 RX ADMIN — SODIUM CHLORIDE, POTASSIUM CHLORIDE, SODIUM LACTATE AND CALCIUM CHLORIDE: 600; 310; 30; 20 INJECTION, SOLUTION INTRAVENOUS at 09:25

## 2019-04-12 RX ADMIN — DEXAMETHASONE SODIUM PHOSPHATE 4 MG: 4 INJECTION, SOLUTION INTRAMUSCULAR; INTRAVENOUS at 12:15

## 2019-04-12 RX ADMIN — DOCUSATE SODIUM 100 MG: 100 CAPSULE, LIQUID FILLED ORAL at 21:23

## 2019-04-12 RX ADMIN — MORPHINE SULFATE 0.2 MG: 10 INJECTION, SOLUTION INTRAMUSCULAR; INTRAVENOUS at 11:31

## 2019-04-12 RX ADMIN — OXYTOCIN 10 UNITS: 10 INJECTION INTRAVENOUS at 12:02

## 2019-04-12 RX ADMIN — LEVOTHYROXINE SODIUM 88 MCG: 88 TABLET ORAL at 15:14

## 2019-04-12 RX ADMIN — MIDAZOLAM 1 MG: 1 INJECTION INTRAMUSCULAR; INTRAVENOUS at 11:23

## 2019-04-12 RX ADMIN — METOCLOPRAMIDE 10 MG: 5 INJECTION, SOLUTION INTRAMUSCULAR; INTRAVENOUS at 10:32

## 2019-04-12 RX ADMIN — SODIUM CHLORIDE, POTASSIUM CHLORIDE, SODIUM LACTATE AND CALCIUM CHLORIDE: 600; 310; 30; 20 INJECTION, SOLUTION INTRAVENOUS at 12:33

## 2019-04-12 RX ADMIN — SODIUM CHLORIDE, POTASSIUM CHLORIDE, SODIUM LACTATE AND CALCIUM CHLORIDE: 600; 310; 30; 20 INJECTION, SOLUTION INTRAVENOUS at 10:35

## 2019-04-12 RX ADMIN — Medication 50 MCG: at 11:53

## 2019-04-12 RX ADMIN — BUPIVACAINE HYDROCHLORIDE IN DEXTROSE 1.6 ML: 7.5 INJECTION, SOLUTION SUBARACHNOID at 11:31

## 2019-04-12 RX ADMIN — FENTANYL CITRATE 12.5 MCG: 50 INJECTION INTRAMUSCULAR; INTRAVENOUS at 11:31

## 2019-04-12 RX ADMIN — FAMOTIDINE 20 MG: 10 INJECTION, SOLUTION INTRAVENOUS at 10:32

## 2019-04-12 RX ADMIN — ONDANSETRON 4 MG: 2 INJECTION INTRAMUSCULAR; INTRAVENOUS at 11:21

## 2019-04-12 RX ADMIN — Medication 10 ML: at 18:12

## 2019-04-12 RX ADMIN — OXYTOCIN 20 UNITS: 10 INJECTION INTRAVENOUS at 11:57

## 2019-04-12 RX ADMIN — OXYTOCIN 10 UNITS: 10 INJECTION INTRAVENOUS at 12:35

## 2019-04-12 RX ADMIN — KETOROLAC TROMETHAMINE 30 MG: 30 INJECTION, SOLUTION INTRAMUSCULAR at 12:08

## 2019-04-12 RX ADMIN — LIDOCAINE HYDROCHLORIDE 2 ML: 10 INJECTION, SOLUTION INFILTRATION; PERINEURAL at 11:25

## 2019-04-12 ASSESSMENT — PULMONARY FUNCTION TESTS
PIF_VALUE: 0

## 2019-04-12 ASSESSMENT — PAIN SCALES - GENERAL
PAINLEVEL_OUTOF10: 1
PAINLEVEL_OUTOF10: 0

## 2019-04-12 NOTE — FLOWSHEET NOTE
Pt admitted to [facility] for primary  section due to uterine fibroids and bicornuate uterus. Pt and family oriented to room, call light, and binder. Whiteboard updated, gown and urine collection container given. Pt aware of urine drug testing and signed written consent. EFM applied with consent to central monitor bank with alarms on. Uterus soft and non-tender. Pt reports fetal movement. Pt denies vaginal leakage of fluid or bleeding. IV started, infusing w/o difficulty, labs sent. Admission history obtained and appropriate consents reviewed and signed.

## 2019-04-12 NOTE — ANESTHESIA PRE PROCEDURE
Department of Anesthesiology  Preprocedure Note       Name:  Juan Gallegos   Age:  34 y.o.  :  1985                                          MRN:  [id number]         Date:  2019      Surgeon: Agnes Serna):  MD Senait Archer MD    Procedure:  SECTION (N/A )    Medications prior to admission:   Prior to Admission medications    Medication Sig Start Date End Date Taking? Authorizing Provider   Prenatal Vit-Fe Fumarate-FA (PRENATAL VITAMIN PO) Take 1 tablet by mouth daily   Yes Historical Provider, MD   SYNTHROID 137 MCG tablet Take 1 tablet daily minus 1 tablet a month 19  Yes Andria Harrell MD   SYNTHROID 88 MCG tablet Take 1 tablet by mouth every morning (before breakfast) 19   Andria Harrell MD   CYTOMEL 5 MCG tablet Take 1 tablet by mouth daily 19  Andria Harrell MD       Current medications:    Current Facility-Administered Medications   Medication Dose Route Frequency Provider Last Rate Last Dose    lactated ringers infusion   Intravenous Continuous Senait Gilbert  mL/hr at 19 1035      sodium chloride flush 0.9 % injection 10 mL  10 mL Intravenous 2 times per day Senait Gilbert MD        sodium chloride flush 0.9 % injection 10 mL  10 mL Intravenous PRN Senait Gilbert MD        oxytocin (PITOCIN) 30 units in 500 mL infusion  1 peggy-units/min Intravenous Continuous Senait Gilbert MD        ondansetron (ZOFRAN) injection 4 mg  4 mg Intravenous Q6H PRN Senait Gilbert MD        ceFAZolin (ANCEF) 2 g in dextrose 5 % 100 mL IVPB  2 g Intravenous On Call to  [facility], MD        levothyroxine (SYNTHROID) tablet 88 mcg  88 mcg Oral Daily Senait Gilbert MD           Allergies:     Allergies   Allergen Reactions    Percocet [Oxycodone-Acetaminophen]      Migraine       Problem List:    Patient Active Problem List   Diagnosis Code    Acne L70.9    Endometriosis N80.9    Uterine fibroid D25.9    2018    CREATININE 0.6 2018    GFRAA >60 2018    AGRATIO 1.5 2018    LABGLOM >60 2018    GLUCOSE 71 2018    PROT 6.2 2018    CALCIUM 8.9 2018    BILITOT <0.2 2018    ALKPHOS 65 2018    AST 12 2018    ALT 12 2018     B POS    T&C pending      Anesthesia Evaluation  Patient summary reviewed and Nursing notes reviewed no history of anesthetic complications:   Airway: Mallampati: I  TM distance: >3 FB   Neck ROM: full  Mouth opening: > = 3 FB Dental: normal exam         Pulmonary:Negative Pulmonary ROS and normal exam  breath sounds clear to auscultation                             Cardiovascular:Negative CV ROS  Exercise tolerance: good (>4 METS),           Rhythm: regular  Rate: normal           Beta Blocker:  Not on Beta Blocker         Neuro/Psych:   Negative Neuro/Psych ROS  (+) headaches: migraine headaches,             GI/Hepatic/Renal: Neg GI/Hepatic/Renal ROS            Endo/Other:    (+) hypothyroidism::., no malignancy/cancer. (-) diabetes mellitus, hyperthyroidism, blood dyscrasia, arthritis, no electrolyte abnormalities, no malignancy/cancer               Abdominal:           Vascular: negative vascular ROS. Anesthesia Plan      spinal     ASA 2     (OB History        1    Para   0    Term   0       0    AB   0    Living           SAB   0    TAB   0    Ectopic   0    Molar        Multiple        Live Births                  Mali Joe is a 34y.o. year-old female admitted to Daja Callejas MD for primary C/S. Gestational age is 43w3d. Her There is no height or weight on file to calculate BMI. She was seen, examined and her chart was reviewed (including anesthesia, drug and allergy history). No interval changes are noted to her history and physical examination. (except as noted above).     Risks/benefits/alternatives of both neuraxial and general anesthesia were discussed and she agrees to proceed at the direction of the care team.    YONI Mcintyre CRNA  April 12, 2019  11:07 AM  )        Anesthetic plan and risks discussed with patient. Use of blood products discussed with patient whom consented to blood products.                    YONI Mcintyre CRNA   4/12/2019

## 2019-04-12 NOTE — PLAN OF CARE
Problem: Pain - Acute:  Goal: Pain level will decrease  Description  Pain level will decrease  4/12/2019 1901 by Keegan Brunner RN  Outcome: Met This Shift   Toradol given as ordered.

## 2019-04-12 NOTE — L&D DELIVERY NOTE
was again reassured throughout. The uterus was examined at the end, and the findings described above. After good hemostasis was reassured once again, the Ish retractor was then removed from the abdomen. The peritoneum was closed with 3-0 Vicryl in a running fashion after first and second lap and instrument count were correct. The rectus muscles were approximated with 3 interrupted sutures of O-Vycril to prevent future diastasis. The fascia was closed with 0 Vicryl in a running fashion. Good hemostasis was assured. The subcuticular layers were irrigated with warm normal saline and bleeding controlled with Bovie cautery. The subcuticular layer was reapproximated with 3-0 Vicryl in interrupted fashion. The skin was closed with a 4-0 Vicryl in a subcuticular fashion. Derma bond was applied to skin. The patient tolerated the procedure well. Sponge, lap, and needle counts were correct times three and the patient was taken to recovery in stable condition.     Electronically signed by Fidelia Horton MD on 4/12/2019 at 12:48 PM

## 2019-04-12 NOTE — H&P
Department of Obstetrics and Gynecology   Obstetrics History and Physical        CHIEF COMPLAINT:  Primary CS    HISTORY OF PRESENT ILLNESS:    Ai Nunez  is a 34 y.o.  female at 43w3d presents with a chief complaint as above and is being admitted for   without tubal ligation    Estimated Due Date: Estimated Date of Delivery: 4/15/19    PRENATAL CARE: Complicated by: 1. Uterine fibroids, the largest is 8 cm  2. H/o secondary infertility with IUI  3. Bicornuate uterus  4.  Large for dates fetus on third trim US     PAST OB HISTORY:  OB History        1    Para   0    Term   0       0    AB   0    Living           SAB   0    TAB   0    Ectopic   0    Molar        Multiple        Live Births                  Past Medical History:        Diagnosis Date    Acne     Hypothyroidism     Infertility, female     IUI    Migraines     Uterine fibroid      Past Surgical History:        Procedure Laterality Date    BREAST ENHANCEMENT SURGERY      BREAST SURGERY      DILATION AND CURETTAGE OF UTERUS      x 2 with diag lap    TONSILLECTOMY  2014    WISDOM TOOTH EXTRACTION Bilateral      Allergies:  Percocet [oxycodone-acetaminophen]  Social History:    Social History     Socioeconomic History    Marital status:      Spouse name: Not on file    Number of children: Not on file    Years of education: Not on file    Highest education level: Not on file   Occupational History    Not on file   Social Needs    Financial resource strain: Not on file    Food insecurity:     Worry: Not on file     Inability: Not on file    Transportation needs:     Medical: Not on file     Non-medical: Not on file   Tobacco Use    Smoking status: Never Smoker    Smokeless tobacco: Never Used   Substance and Sexual Activity    Alcohol use: Not Currently     Comment: soccially    Drug use: No    Sexual activity: Yes     Partners: Male   Lifestyle    Physical activity:     Days per week: Not on file     Minutes per session: Not on file    Stress: Not on file   Relationships    Social connections:     Talks on phone: Not on file     Gets together: Not on file     Attends [employer] service: Not on file     Active member of club or organization: Not on file     Attends meetings of clubs or organizations: Not on file     Relationship status: Not on file    Intimate partner violence:     Fear of current or ex partner: Not on file     Emotionally abused: Not on file     Physically abused: Not on file     Forced sexual activity: Not on file   Other Topics Concern    Not on file   Social History Narrative    ** Merged History Encounter **          Family History:       Problem Relation Age of Onset    High Cholesterol Mother     High Blood Pressure Father     Cancer Paternal Uncle         esophageal    Diabetes Paternal Grandmother     Atrial Fibrillation Paternal Grandmother     Heart Disease Paternal Grandmother     High Blood Pressure Paternal Grandmother     High Cholesterol Paternal Grandmother     High Cholesterol Maternal Grandfather     Prostate Cancer Maternal Grandfather      Medications Prior to Admission:  Medications Prior to Admission: Prenatal Vit-Fe Fumarate-FA (PRENATAL VITAMIN PO), Take 1 tablet by mouth daily  SYNTHROID 137 MCG tablet, Take 1 tablet daily minus 1 tablet a month  SYNTHROID 88 MCG tablet, Take 1 tablet by mouth every morning (before breakfast)  CYTOMEL 5 MCG tablet, Take 1 tablet by mouth daily  [DISCONTINUED] Multiple Vitamins-Minerals (MULTIVITAMIN PO), Take by mouth    REVIEW OF SYSTEMS:  negative     PHYSICAL EXAM:    There were no vitals filed for this visit. General appearance:  awake, alert, cooperative, no apparent distress, and appears stated age  Neurologic:  Awake, alert, oriented to name, place and time.     Lungs:  No increased work of breathing, good air exchange  Abdomen:  Soft, non tender, gravid, fundal height consistent with the gestational age, EFW by Leopold's maneuvers is 3900 grams  Pelvis:  Adequate pelvis  Cervix: closed  Contraction frequency: none  Membranes:  Intact  Labs:   CBC:   Lab Results   Component Value Date    WBC 9.2 04/12/2019    RBC 3.78 04/12/2019    HGB 11.2 04/12/2019    HCT 33.1 04/12/2019    MCV 87.5 04/12/2019    MCH 29.7 04/12/2019    MCHC 33.9 04/12/2019    RDW 14.2 04/12/2019     04/12/2019    MPV 8.7 04/12/2019       ASSESSMENT: 35 yo G1 @ 39 4/7 w   1. Planned CS due to h/o fibroid uterus, bicornuate uterus, infertility and larger fetus.    Discussed with patient and her  RBC of surgery, including risk of blood loss and potentially need for blood transfusion, risk of infection, TTN, and more, recovery antibiotins discussed at length, all Qs answered      PLAN: Admit  Fetus: Reassuring  GBS: Negative    Electronically signed by Kathleen Armstrong MD on 4/12/2019 at 9:41 AM

## 2019-04-12 NOTE — LACTATION NOTE
This note was copied from a baby's chart. Lactation Progress Note  Initial Consult    Data: Referral received per RN. Action: LC to PACU room. Mother resting in bed. Infant skin to skin with mother, fussy, showing hunger cues at this time. Mother states agreeable to consult from [facility] at this time. LC reviewed Care Plan for First 24 Hours of Life already in patient binder. Discussed recognizing hunger cues and offering the breast when cues are shown. Encouraged breastfeeding on demand and attempting/offering at least every 3 hours. Informed infant may have one 5 hour stretch of sleep in a 24 hour period. Encouraged unlimited skin to skin contact with infant and reviewed benefits including better temperature, heart rate, respiration, blood pressure, and blood sugar regulation. Also increased bonding and milk supply associated with skin to skin contact. Discussed feeding positions, latch on techniques, signs of milk transfer, output goals and normal feeding/sleeping behaviors. LC referred mother to binder for additional information about breastfeeding and skin to skin contact. With mother's permission, LC performed a breast exam and found normal anatomy and sufficient glandular tissue for breastfeeding. [facility] taught and mother returned demonstration for hand expression and breast compressions to increase flow of milk and reduce feeding duration. Several drops of colostrum were hand expressed per [facility] and mother. Mother requesting to obtain a breast pump through insurance via [employer]. [facility] faxed a Rx from her provider and a face sheet with insurance information to [employer] at [phone]. LC reinforced importance of positioning infant nose to nipple, belly to belly, waiting for wide open mouth, and bringing baby onto breast to ensure a deep latch. Discussed importance of obtaining deep latch to ensure proper milk transfer, milk production and supply and maternal comfort.     Infant latched after several attempts and had a few good suck bursts after mother hand expressed drops to infant. LC  provided a lactation consultant business card, directed mother to [facility] [employer], [facility], [address] [place] [address] [employer] for evidence based information, and encouraged mother to call for a feeding. Response: Mother verbalizes understanding of information given and denies further needs at this time. Mother states will call for next feeding.

## 2019-04-12 NOTE — ANESTHESIA PROCEDURE NOTES
Spinal Block    Patient location during procedure: OB  Start time: 4/12/2019 11:24 AM  End time: 4/12/2019 11:32 AM  Reason for block: post-op pain management and primary anesthetic  Staffing  Anesthesiologist: Dallas Higgins MD  Resident/CRNA: YONI Tucker CRNA  Performed: resident/CRNA   Preanesthetic Checklist  Completed: patient identified, site marked, surgical consent, pre-op evaluation, timeout performed, IV checked, risks and benefits discussed, monitors and equipment checked, oxygen available and patient being monitored  Spinal Block  Patient position: sitting  Prep: ChloraPrep  Patient monitoring: continuous pulse ox and frequent blood pressure checks  Approach: midline  Location: L3/L4  Procedures: paresthesia technique  Provider prep: sterile gloves  Local infiltration: lidocaine  Dose: 0.2  Agent: bupivacaine  Adjuvant: duramorph  Dose: 1.6  Dose: 1.6  Needle  Needle type:  Iva   Needle gauge: 27 G  Needle length: 5 in  Needle insertion depth: 5 cm  Assessment  Sensory level: T6  Swirl obtained: Yes  CSF: clear  Attempts: 1  Hemodynamics: stable

## 2019-04-12 NOTE — FLOWSHEET NOTE
1812 - patient in bed. VSS. Toradol given as ordered . Patient stated pain level a 1. Pad and chux changed. Assisted patient in putting infant to breast. Infant fussy. Drops of colostrum hand expressed. I taught and mother returned demonstration for hand expression. Encouraged mother to do lots of skin to skin and she can also hand express on spoon and feed infant from spoon. Name and number on white board.

## 2019-04-12 NOTE — ANESTHESIA POSTPROCEDURE EVALUATION
Department of Anesthesiology  Postprocedure Note    Patient: Oren Jack  MRN: [id number]  YOB: 1985  Date of evaluation: 2019  Time:  12:49 PM     Procedure Summary     Date:  19 Room / Location:  [facility] L&D OR [address] [place] [address] L&D OR    Anesthesia Start:  1119 Anesthesia Stop:  1247    Procedure:  Primary  SECTION low transverse uterine incision at 1154 (N/A Abdomen) Diagnosis:  (Primary )    Surgeon:  King Humaira MD Responsible Provider:  Calvin Conde MD    Anesthesia Type:  spinal ASA Status:  2          Anesthesia Type: spinal    Miya Phase I: Miya Score: 10    Miya Phase II:      Last vitals: Reviewed and per EMR flowsheets.        Anesthesia Post Evaluation    Patient location during evaluation: PACU  Patient participation: complete - patient participated  Level of consciousness: awake and alert  Pain score: 0  Airway patency: patent  Nausea & Vomiting: no nausea and no vomiting  Complications: no  Cardiovascular status: hemodynamically stable  Respiratory status: spontaneous ventilation and acceptable  Hydration status: euvolemic  Comments: /65   Pulse 77   Temp 36.7 °C (98.1 °F) (Oral)   Resp 14   LMP 2018

## 2019-04-13 PROCEDURE — 6360000002 HC RX W HCPCS: Performed by: OBSTETRICS & GYNECOLOGY

## 2019-04-13 PROCEDURE — 6370000000 HC RX 637 (ALT 250 FOR IP): Performed by: OBSTETRICS & GYNECOLOGY

## 2019-04-13 PROCEDURE — 1220000000 HC SEMI PRIVATE OB R&B

## 2019-04-13 PROCEDURE — 6360000002 HC RX W HCPCS: Performed by: ANESTHESIOLOGY

## 2019-04-13 RX ORDER — LIOTHYRONINE SODIUM 5 UG/1
5 TABLET ORAL DAILY
Status: DISCONTINUED | OUTPATIENT
Start: 2019-04-13 | End: 2019-04-15 | Stop reason: HOSPADM

## 2019-04-13 RX ADMIN — LEVOTHYROXINE SODIUM 88 MCG: 88 TABLET ORAL at 18:19

## 2019-04-13 RX ADMIN — KETOROLAC TROMETHAMINE 30 MG: 30 INJECTION, SOLUTION INTRAMUSCULAR; INTRAVENOUS at 00:15

## 2019-04-13 RX ADMIN — DOCUSATE SODIUM 100 MG: 100 CAPSULE, LIQUID FILLED ORAL at 09:08

## 2019-04-13 RX ADMIN — KETOROLAC TROMETHAMINE 30 MG: 30 INJECTION, SOLUTION INTRAMUSCULAR; INTRAVENOUS at 06:18

## 2019-04-13 RX ADMIN — DOCUSATE SODIUM 100 MG: 100 CAPSULE, LIQUID FILLED ORAL at 23:31

## 2019-04-13 RX ADMIN — IBUPROFEN 800 MG: 400 TABLET ORAL at 18:19

## 2019-04-13 RX ADMIN — PRENATAL VIT W/ FE FUMARATE-FA TAB 27-0.8 MG 1 TABLET: 27-0.8 TAB at 09:08

## 2019-04-13 RX ADMIN — HYDROCODONE BITARTRATE AND ACETAMINOPHEN 1 TABLET: 5; 325 TABLET ORAL at 23:31

## 2019-04-13 RX ADMIN — LIOTHYRONINE SODIUM 5 MCG: 5 TABLET ORAL at 18:40

## 2019-04-13 RX ADMIN — ENOXAPARIN SODIUM 40 MG: 40 INJECTION SUBCUTANEOUS at 09:07

## 2019-04-13 RX ADMIN — HYDROCODONE BITARTRATE AND ACETAMINOPHEN 1 TABLET: 5; 325 TABLET ORAL at 18:45

## 2019-04-13 RX ADMIN — KETOROLAC TROMETHAMINE 30 MG: 30 INJECTION, SOLUTION INTRAMUSCULAR; INTRAVENOUS at 12:14

## 2019-04-13 ASSESSMENT — PAIN DESCRIPTION - PROGRESSION
CLINICAL_PROGRESSION: GRADUALLY WORSENING
CLINICAL_PROGRESSION: GRADUALLY WORSENING

## 2019-04-13 ASSESSMENT — PAIN DESCRIPTION - FREQUENCY
FREQUENCY: INTERMITTENT

## 2019-04-13 ASSESSMENT — PAIN SCALES - GENERAL
PAINLEVEL_OUTOF10: 1
PAINLEVEL_OUTOF10: 5
PAINLEVEL_OUTOF10: 0
PAINLEVEL_OUTOF10: 2
PAINLEVEL_OUTOF10: 0
PAINLEVEL_OUTOF10: 4
PAINLEVEL_OUTOF10: 0
PAINLEVEL_OUTOF10: 5
PAINLEVEL_OUTOF10: 0

## 2019-04-13 ASSESSMENT — PAIN DESCRIPTION - LOCATION
LOCATION: ABDOMEN

## 2019-04-13 ASSESSMENT — PAIN DESCRIPTION - PAIN TYPE
TYPE: ACUTE PAIN;SURGICAL PAIN

## 2019-04-13 ASSESSMENT — PAIN DESCRIPTION - ONSET
ONSET: GRADUAL
ONSET: GRADUAL

## 2019-04-13 ASSESSMENT — PAIN - FUNCTIONAL ASSESSMENT
PAIN_FUNCTIONAL_ASSESSMENT: ACTIVITIES ARE NOT PREVENTED

## 2019-04-13 ASSESSMENT — PAIN DESCRIPTION - DESCRIPTORS
DESCRIPTORS: CRAMPING;SORE
DESCRIPTORS: ACHING;CRAMPING;DISCOMFORT
DESCRIPTORS: ACHING;CRAMPING;DISCOMFORT;DULL;SORE

## 2019-04-13 ASSESSMENT — PAIN DESCRIPTION - ORIENTATION
ORIENTATION: LOWER

## 2019-04-13 NOTE — LACTATION NOTE
This note was copied from a baby's chart. LC to room for feeding attempt. LC assisted mother in positioning infant at left breast in football hold for feeding. Infant fussy and gaggy at this time. LC assisted mother in latching infant using C hold after hand expressing 1 drop of colostrum. Infant latched well after a couple of attempts. [facility] taught mother breast compressions to aide in milk removal and feeding duration. Infant had good periods of suck bursts during the 7 minute feeding. LC brought in the Protective Breastfeeding Care Plan and discussed with mother. LC delivered and set up a [employer] Breast pump.  provided supplies necessary for pumping including wash basin, soap, bottle , towels, oral syringes with caps and extra bottles. [facility] educated mother on assembly, use, cleaning, and milk storage guidelines. Encouraged mother to pump every 2-3 hours or at least 8 times in 24 hour period using hands on pumping technique. Mother verbalizes understanding of all information given. LC encouraged mother to pump soon and then save for next feeding attempt. Mother agreed. LC encouraged mother to continue with hand expressing, offering breastfeeding then pumping (15 min if good feeding and 30 if poor/no feeding at breast). Mother agreed and denies any further needs at this time.

## 2019-04-13 NOTE — LACTATION NOTE
This note was copied from a baby's chart. LC to room. Mother states infant had another 7 minute feeding that he was sleepy for and only had a few sucks. Mother states she only obtained a few drops after pumping and gave to infant. LC noted mother pumping at this time, but not using hands on pumping technique. LC encouraged mother to use hands on pumping with every pumping session. Mother agreed and denies any further needs at this time. LC gave RN mother's breast pump obtained through insurance to give to mother.

## 2019-04-13 NOTE — PLAN OF CARE
Problem: Discharge Planning:  Goal: Discharged to appropriate level of care  Description  Discharged to appropriate level of care  4/13/2019 0559 by Carmelina Yuen RN  Outcome: Ongoing  4/12/2019 1901 by Rosa Glynn RN  Outcome: Ongoing  4/12/2019 1859 by Rosa Glynn RN  Outcome: Ongoing     Problem: Fluid Volume - Imbalance:  Goal: Absence of postpartum hemorrhage signs and symptoms  Description  Absence of postpartum hemorrhage signs and symptoms  4/13/2019 0559 by Carmelina Yuen RN  Outcome: Ongoing  Note:   Running IV fluids discontinued, capped IV in place. Garcia to be removed at around 0600. Draining clear yellow urine. 4/12/2019 1901 by Rosa Glynn RN  Outcome: Met This Shift  4/12/2019 1859 by Rosa Glynn RN  Outcome: Ongoing  Goal: Absence of imbalanced fluid volume signs and symptoms  Description  Absence of imbalanced fluid volume signs and symptoms  4/13/2019 0559 by Carmelina Yuen RN  Outcome: Ongoing  4/12/2019 1901 by Rosa Glynn RN  Outcome: Ongoing  4/12/2019 1859 by Rosa Glynn RN  Outcome: Met This Shift     Problem: Infection - Surgical Site:  Goal: Will show no infection signs and symptoms  Description  Will show no infection signs and symptoms  4/13/2019 0559 by Carmelina Yuen RN  Outcome: Ongoing  4/12/2019 1901 by Rosa Glynn RN  Outcome: Ongoing  4/12/2019 1859 by Rosa Glynn RN  Outcome: Met This Shift     Problem: Mood - Altered:  Goal: Mood stable  Description  Mood stable  4/13/2019 0559 by Carmelina Yuen RN  Outcome: Ongoing  Note:   Optomistic with birth of infant. Help available at home.   4/12/2019 1901 by Rosa Glynn RN  Outcome: Met This Shift  4/12/2019 1859 by Rosa Glynn RN  Outcome: Met This Shift     Problem: Nausea/Vomiting:  Goal: Absence of nausea/vomiting  Description  Absence of nausea/vomiting  4/13/2019 0559 by Carmelina Yuen RN  Outcome: Ongoing  4/12/2019 1901 by Rosa Glynn RN  Outcome: Met This Shift  4/12/2019 1859 by Sushil Kelsey RN  Outcome: Met This Shift     Problem: Pain - Acute:  Goal: Pain level will decrease  Description  Pain level will decrease  4/13/2019 0559 by Jonatan Melo RN  Outcome: Ongoing  Note:   Toradol sufficient for pain management.   4/12/2019 1901 by Sushil Kelsey RN  Outcome: Met This Shift  4/12/2019 1859 by Sushil Kelsey RN  Outcome: Met This Shift     Problem: Urinary Retention:  Goal: Urinary elimination within specified parameters  Description  Urinary elimination within specified parameters  4/13/2019 0559 by Jonatan Melo RN  Outcome: Ongoing  4/12/2019 1901 by Sushil Kelsey RN  Outcome: Met This Shift  4/12/2019 1859 by Sushil Kelsey RN  Outcome: Ongoing     Problem: Venous Thromboembolism:  Goal: Absence of signs or symptoms of impaired coagulation  Description  Absence of signs or symptoms of impaired coagulation  4/13/2019 0559 by Jonatan Melo RN  Outcome: Ongoing  4/12/2019 1901 by Sushil Kelsey RN  Outcome: Met This Shift  4/12/2019 1859 by Sushil Kelsey RN  Outcome: Met This Shift

## 2019-04-13 NOTE — PROGRESS NOTES
Department of Obstetrics and Gynecology   Postpartum Rounds    SUBJECTIVE:  Pain is controlled with non-steroidal anti-inflammatory drugs. The patient is tolerating regular diet. She is ambulating. Her lochia is normal.    OBJECTIVE:  Vital Signs: /68   Pulse 74   Temp 98.1 °F (36.7 °C) (Oral)   Resp 16   LMP 2018   SpO2 94%   Breastfeeding? Unknown   Appearance/Psychiatric: awake, alert, cooperative, no apparent distress, appears stated age  Constitutional: The patient is well nourished. Cardiovascular: She does not have edema. Respiratory: Respiratory effort is normal.  Gastrointestinal: Soft, appropriately tender, uterine fundus is firm at umbilicus  The incision is clean, dry and intact  Extremities: nontender to palpation    LABS / IMAGING:  CBC: pending       ASSESSMENT:    Postoperative Day 1 s/p Planned Primary      PLAN:   1. Advance postoperative care as tolerated  2.  Discharge home on Postpartum Day 3    Electronically signed by Monalisa Perez MD on 2019 at 8:12 AM

## 2019-04-13 NOTE — FLOWSHEET NOTE
Peripheral IV converted to capped IV at 0500. Garcia catheter discontinued without problem.   Patient up to bathroom for chelsie care and hygiene with gown change

## 2019-04-13 NOTE — PLAN OF CARE
Problem: Discharge Planning:  Goal: Discharged to appropriate level of care  Description  Discharged to appropriate level of care  4/13/2019 1705 by Whitfield Eisenmenger, RN  Outcome: Met This Shift  4/13/2019 0559 by Mariela Brown RN  Outcome: Ongoing     Problem: Fluid Volume - Imbalance:  Goal: Absence of postpartum hemorrhage signs and symptoms  Description  Absence of postpartum hemorrhage signs and symptoms  4/13/2019 1705 by Whitfield Eisenmenger, RN  Outcome: Met This Shift  4/13/2019 0559 by Mairela Brown RN  Outcome: Ongoing  Note:   Running IV fluids discontinued, capped IV in place. Garcia to be removed at around 0600. Draining clear yellow urine. Goal: Absence of imbalanced fluid volume signs and symptoms  Description  Absence of imbalanced fluid volume signs and symptoms  4/13/2019 1705 by Whitfield Eisenmenger, RN  Outcome: Met This Shift  4/13/2019 0559 by Mariela Brown RN  Outcome: Ongoing     Problem: Infection - Surgical Site:  Goal: Will show no infection signs and symptoms  Description  Will show no infection signs and symptoms  4/13/2019 1705 by Whitfield Eisenmenger, RN  Outcome: Met This Shift  4/13/2019 0559 by Mariela Brown RN  Outcome: Ongoing     Problem: Mood - Altered:  Goal: Mood stable  Description  Mood stable  4/13/2019 1705 by Whitfield Eisenmenger, RN  Outcome: Met This Shift  4/13/2019 0559 by Mariela Brown RN  Outcome: Ongoing  Note:   Optomistic with birth of infant. Help available at home.      Problem: Nausea/Vomiting:  Goal: Absence of nausea/vomiting  Description  Absence of nausea/vomiting  4/13/2019 1705 by Whitfield Eisenmenger, RN  Outcome: Met This Shift  4/13/2019 0559 by Mariela Brown RN  Outcome: Ongoing     Problem: Pain - Acute:  Goal: Pain level will decrease  Description  Pain level will decrease  4/13/2019 1705 by Whitfield Eisenmenger, RN  Outcome: Met This Shift  4/13/2019 0559 by Mariela Brown RN  Outcome: Ongoing  Note:   Toradol sufficient for pain management.      Problem: Urinary Retention:  Goal: Urinary elimination within specified parameters  Description  Urinary elimination within specified parameters  4/13/2019 1705 by Whitfield Eisenmenger, RN  Outcome: Met This Shift  4/13/2019 0559 by Mariela Brown RN  Outcome: Ongoing     Problem: Venous Thromboembolism:  Goal: Absence of signs or symptoms of impaired coagulation  Description  Absence of signs or symptoms of impaired coagulation  4/13/2019 1705 by Whitfield Eisenmenger, RN  Outcome: Met This Shift  4/13/2019 0559 by Mariela Brown RN  Outcome: Ongoing

## 2019-04-13 NOTE — ANESTHESIA POST-OP
[facility] Department of Anesthesiology  Post-Anesthesia Note       Name:  Ulisses Bhatt                                         Age:  34 y.o. MRN:  [id number]   39w4d  OB History        1    Para   1    Term   1       0    AB   0    Living   1       SAB   0    TAB   0    Ectopic   0    Molar        Multiple   0    Live Births   1                Anesthesia Post Op Pain Management: yes - Duramorph via SAB    Andreia Bales was evaluated on postpartum day 1. She expresses no concerns regarding her anesthesia care at this time. Last Vitals & Oxygen Saturation: /68   Pulse 74   Temp 36.7 °C (98.1 °F) (Oral)   Resp 16   LMP 2018   SpO2 94%   Breastfeeding?  Unknown   Patient Vitals for the past 4 hrs:   BP Temp Temp src Pulse Resp SpO2   19 0805 107/68 36.7 °C (98.1 °F) Oral 74 16 94 %   19 0627 97/63 36.9 °C (98.4 °F) Oral 66 16 100 %       Anesthesia: spinal    Level of consciousness: awake, alert and oriented    Respiratory: stable     Cardiovascular: stable     Hydration: stable     PONV:  none    Pruritis: none    Post Dural Puncture Headache: denies    Post-op pain: adequate analgesia    Out of bed and ambulating without difficulty: Yes    Post-op assessment: no apparent anesthetic complications    Complications:  none      YONI Cardenas - ALINE  2019   9:26 AM

## 2019-04-13 NOTE — LACTATION NOTE
This note was copied from a baby's chart. LC to room. Mother states infant is not latching for feedings, she is having some difficulty with hand expression for feeding attempts. LC discussed pumping after next feeding attempt if infant does not latch/show interest in feeding at that time. Mother agreed. [facility] wrote name and circled number on whiteboard and encouraged mother to call when infant begins showing cues. Mother agreed and denies any further needs at this time.

## 2019-04-14 LAB
HCT VFR BLD CALC: 32.8 % (ref 36–48)
HEMOGLOBIN: 10.7 G/DL (ref 12–16)
MCH RBC QN AUTO: 28.9 PG (ref 26–34)
MCHC RBC AUTO-ENTMCNC: 32.7 G/DL (ref 31–36)
MCV RBC AUTO: 88.3 FL (ref 80–100)
PDW BLD-RTO: 14.9 % (ref 12.4–15.4)
PLATELET # BLD: 303 K/UL (ref 135–450)
PMV BLD AUTO: 9.1 FL (ref 5–10.5)
RBC # BLD: 3.71 M/UL (ref 4–5.2)
WBC # BLD: 11.5 K/UL (ref 4–11)

## 2019-04-14 PROCEDURE — 6360000002 HC RX W HCPCS: Performed by: OBSTETRICS & GYNECOLOGY

## 2019-04-14 PROCEDURE — 1220000000 HC SEMI PRIVATE OB R&B

## 2019-04-14 PROCEDURE — 85027 COMPLETE CBC AUTOMATED: CPT

## 2019-04-14 PROCEDURE — 36415 COLL VENOUS BLD VENIPUNCTURE: CPT

## 2019-04-14 PROCEDURE — 6370000000 HC RX 637 (ALT 250 FOR IP): Performed by: OBSTETRICS & GYNECOLOGY

## 2019-04-14 RX ORDER — HYDROCODONE BITARTRATE AND ACETAMINOPHEN 5; 325 MG/1; MG/1
1 TABLET ORAL EVERY 4 HOURS PRN
Status: DISCONTINUED | OUTPATIENT
Start: 2019-04-14 | End: 2019-04-15 | Stop reason: HOSPADM

## 2019-04-14 RX ORDER — HYDROCODONE BITARTRATE AND ACETAMINOPHEN 5; 325 MG/1; MG/1
2 TABLET ORAL EVERY 4 HOURS PRN
Status: DISCONTINUED | OUTPATIENT
Start: 2019-04-14 | End: 2019-04-15 | Stop reason: HOSPADM

## 2019-04-14 RX ADMIN — HYDROCODONE BITARTRATE AND ACETAMINOPHEN 2 TABLET: 5; 325 TABLET ORAL at 18:04

## 2019-04-14 RX ADMIN — LIOTHYRONINE SODIUM 5 MCG: 5 TABLET ORAL at 09:07

## 2019-04-14 RX ADMIN — ENOXAPARIN SODIUM 40 MG: 40 INJECTION SUBCUTANEOUS at 09:07

## 2019-04-14 RX ADMIN — DOCUSATE SODIUM 100 MG: 100 CAPSULE, LIQUID FILLED ORAL at 09:07

## 2019-04-14 RX ADMIN — LEVOTHYROXINE SODIUM 88 MCG: 88 TABLET ORAL at 09:07

## 2019-04-14 RX ADMIN — HYDROCODONE BITARTRATE AND ACETAMINOPHEN 1 TABLET: 5; 325 TABLET ORAL at 09:54

## 2019-04-14 RX ADMIN — IBUPROFEN 800 MG: 400 TABLET ORAL at 21:51

## 2019-04-14 RX ADMIN — IBUPROFEN 800 MG: 400 TABLET ORAL at 05:31

## 2019-04-14 RX ADMIN — HYDROCODONE BITARTRATE AND ACETAMINOPHEN 1 TABLET: 5; 325 TABLET ORAL at 13:59

## 2019-04-14 RX ADMIN — IBUPROFEN 800 MG: 400 TABLET ORAL at 13:59

## 2019-04-14 RX ADMIN — DOCUSATE SODIUM 100 MG: 100 CAPSULE, LIQUID FILLED ORAL at 21:53

## 2019-04-14 RX ADMIN — PRENATAL VIT W/ FE FUMARATE-FA TAB 27-0.8 MG 1 TABLET: 27-0.8 TAB at 09:06

## 2019-04-14 RX ADMIN — HYDROCODONE BITARTRATE AND ACETAMINOPHEN 1 TABLET: 5; 325 TABLET ORAL at 05:31

## 2019-04-14 ASSESSMENT — PAIN DESCRIPTION - FREQUENCY
FREQUENCY: INTERMITTENT

## 2019-04-14 ASSESSMENT — PAIN - FUNCTIONAL ASSESSMENT
PAIN_FUNCTIONAL_ASSESSMENT: ACTIVITIES ARE NOT PREVENTED

## 2019-04-14 ASSESSMENT — PAIN DESCRIPTION - ONSET: ONSET: GRADUAL

## 2019-04-14 ASSESSMENT — PAIN DESCRIPTION - LOCATION
LOCATION: ABDOMEN

## 2019-04-14 ASSESSMENT — PAIN DESCRIPTION - ORIENTATION
ORIENTATION: LOWER
ORIENTATION: LOWER
ORIENTATION: LOWER;RIGHT

## 2019-04-14 ASSESSMENT — PAIN DESCRIPTION - PAIN TYPE
TYPE: ACUTE PAIN

## 2019-04-14 ASSESSMENT — PAIN SCALES - GENERAL
PAINLEVEL_OUTOF10: 0
PAINLEVEL_OUTOF10: 8
PAINLEVEL_OUTOF10: 7
PAINLEVEL_OUTOF10: 6
PAINLEVEL_OUTOF10: 8
PAINLEVEL_OUTOF10: 3
PAINLEVEL_OUTOF10: 3
PAINLEVEL_OUTOF10: 0

## 2019-04-14 ASSESSMENT — PAIN DESCRIPTION - PROGRESSION: CLINICAL_PROGRESSION: GRADUALLY IMPROVING

## 2019-04-14 ASSESSMENT — PAIN DESCRIPTION - DESCRIPTORS
DESCRIPTORS: CRAMPING;SORE
DESCRIPTORS: SORE
DESCRIPTORS: SORE

## 2019-04-14 NOTE — LACTATION NOTE
This note was copied from a baby's chart. LC to room to make introductions. Report from nurse is next feeding is at noon and another breastfeeding attempt would take place then. Name and number written on white board and informed Mom of [facility] return at noon but to call if needed before. Mother agrees and denies any further need at this time.

## 2019-04-14 NOTE — PROGRESS NOTES
POD #2 --doing well with mild incisional pain. Flatus present, lochia light. VSS, afeb. Incision looks good, C/D/I . A/P - doing well- routine care.

## 2019-04-14 NOTE — FLOWSHEET NOTE
Called Dr. Joel Jj, pt c/o right lower abdomen pain; Dr. Olivia Toney seen the patient and said incision looked well. Rec'd new orders for 1 to 2 Norco instead of 1 Norco every 4 hours as needed. Pt requests to wait until 1800 when she can take both pills together.

## 2019-04-14 NOTE — PROGRESS NOTES
MOB resting in bed. FOB resting at bedside. Baby placed on mothers chest for another attempt for feed. Mother places pain at a 2 on a 0 to 10 scale. Denies needs. Has been encouraged to call nurse to remove child from room for sleep period.

## 2019-04-14 NOTE — PLAN OF CARE
Problem: Fluid Volume - Imbalance:  Goal: Absence of postpartum hemorrhage signs and symptoms  Description  Absence of postpartum hemorrhage signs and symptoms  4/14/2019 0638 by Paula Suarez RN  Outcome: Met This Shift  4/13/2019 1705 by Damien Givens RN  Outcome: Met This Shift  Goal: Absence of imbalanced fluid volume signs and symptoms  Description  Absence of imbalanced fluid volume signs and symptoms  4/14/2019 0638 by Paula Suarez RN  Outcome: Met This Shift  4/13/2019 1705 by Damien Givens RN  Outcome: Met This Shift     Problem: Infection - Surgical Site:  Goal: Will show no infection signs and symptoms  Description  Will show no infection signs and symptoms  4/14/2019 0638 by Paula Suarez RN  Outcome: Met This Shift  4/13/2019 1705 by Damien Givens RN  Outcome: Met This Shift     Problem: Mood - Altered:  Goal: Mood stable  Description  Mood stable  4/14/2019 0638 by Paula Suarez RN  Outcome: Met This Shift  4/13/2019 1705 by Damien Givens RN  Outcome: Met This Shift     Problem: Venous Thromboembolism:  Goal: Absence of signs or symptoms of impaired coagulation  Description  Absence of signs or symptoms of impaired coagulation  4/14/2019 0638 by Paula Suarez RN  Outcome: Met This Shift  4/13/2019 1705 by Damien Givens RN  Outcome: Met This Shift     Problem: Discharge Planning:  Goal: Discharged to appropriate level of care  Description  Discharged to appropriate level of care  4/14/2019 0638 by Paula Suarez RN  Outcome: Ongoing  4/13/2019 1705 by Damien Givens RN  Outcome: Met This Shift     Problem: Pain - Acute:  Goal: Pain level will decrease  Description  Pain level will decrease  4/14/2019 0638 by Paula Suarez RN  Outcome: Ongoing  Note:   Controlled with pain medication, binder, rest and position changes.   4/13/2019 1705 by Damien Givens RN  Outcome: Met This Shift     Problem: Nausea/Vomiting:  Goal: Absence of nausea/vomiting  Description  Absence of nausea/vomiting  4/14/2019 0638 by Paula Suarez RN  Outcome: Completed  4/13/2019 1705 by Damien Givens RN  Outcome: Met This Shift     Problem: Urinary Retention:  Goal: Urinary elimination within specified parameters  Description  Urinary elimination within specified parameters  4/14/2019 0638 by Paula Suarez RN  Outcome: Completed  4/13/2019 1705 by Damien Givens RN  Outcome: Met This Shift

## 2019-04-14 NOTE — LACTATION NOTE
This note was copied from a baby's chart. Mom and baby in chair as LC enters the room. Mother gets pillows and sets up for a feeding. Mom unswaddles baby and places him in the football hold on the right breast. Baby soon starts to cry while rooting around for the breast. Mom places hand on the back of baby's head to attempt to bring him onto the breast. Baby gets nipple into mouth and holds it there. No sucking at this time. [facility] informs mom of holding baby's nape of neck instead of the back of head to keep him from wanting to pull off. Mom demonstrates understanding. Baby seems more calm now and is not pulling away from the breast. Latching is attempted for the next 5 minutes, until baby is too frustrated to attempt again. LC suggests skin to skin by bringing baby up to chest to \"reset\". Getting him to calm. Mom agrees and demonstrates understanding. While baby is calming at mothers chest, LC encourages Mom which seems to help with her frustration. LC suggests to switch baby from football to cross cradle on same breast. Mom is states she is uncomfortable in that hold after a two attempts of latching. Baby is moved back to  football on the right side. Baby is crying and frustrated again after a couple attempts of latching. LC helps mom to manipulate breast tissue in a way that is easier for baby to latch onto. Baby latches on to the breast, gives a couple of non nutritive sucks. Mom says this feels better than before even though he isn't sucking much. No audible swallow heard. Mom would hand express a drop of colostrum before many of her attempts of latching. Baby consumed 4 to 5 drops of colostrum during feeding session. LC encourages mom to pump to protect breastfeeding and offer the breast often. Teaches mom on breast tissue manipulation for baby to get a deeper latch. Mom agrees to understanding and denies further need at this time. Report given to nurse.

## 2019-04-14 NOTE — PLAN OF CARE
Problem: Discharge Planning:  Goal: Discharged to appropriate level of care  Description  Discharged to appropriate level of care  4/14/2019 1659 by John Sosa RN  Outcome: Ongoing  4/14/2019 0638 by Kayli Canchola RN  Outcome: Ongoing     Problem: Fluid Volume - Imbalance:  Goal: Absence of postpartum hemorrhage signs and symptoms  Description  Absence of postpartum hemorrhage signs and symptoms  4/14/2019 1659 by John Sosa RN  Outcome: Ongoing  4/14/2019 0638 by Kayli Canchola RN  Outcome: Met This Shift  Goal: Absence of imbalanced fluid volume signs and symptoms  Description  Absence of imbalanced fluid volume signs and symptoms  4/14/2019 1659 by John Sosa RN  Outcome: Ongoing  4/14/2019 0638 by Kayli Canchola RN  Outcome: Met This Shift     Problem: Infection - Surgical Site:  Goal: Will show no infection signs and symptoms  Description  Will show no infection signs and symptoms  4/14/2019 1659 by John Sosa RN  Outcome: Ongoing  4/14/2019 0638 by Kayli Canchola RN  Outcome: Met This Shift     Problem: Mood - Altered:  Goal: Mood stable  Description  Mood stable  4/14/2019 1659 by John Sosa RN  Outcome: Ongoing  4/14/2019 0638 by Kayli Canchola RN  Outcome: Met This Shift     Problem: Pain - Acute:  Goal: Pain level will decrease  Description  Pain level will decrease  4/14/2019 1659 by John Sosa RN  Outcome: Ongoing  4/14/2019 0638 by Kayli Canchola RN  Outcome: Ongoing  Note:   Controlled with pain medication, binder, rest and position changes.      Problem: Venous Thromboembolism:  Goal: Absence of signs or symptoms of impaired coagulation  Description  Absence of signs or symptoms of impaired coagulation  4/14/2019 1659 by John Sosa RN  Outcome: Ongoing  4/14/2019 0638 by Kayli Canchola RN  Outcome: Met This Shift     Problem: Pain:  Goal: Pain level will decrease  Description  Pain level will decrease  4/14/2019 1659 by John Sosa RN  Outcome: Ongoing  4/14/2019 0638 by Raghu Mosley RN  Outcome: Ongoing  Note:   Controlled with pain medication, binder, rest and position changes.   Goal: Control of acute pain  Description  Control of acute pain  Outcome: Ongoing  Goal: Control of chronic pain  Description  Control of chronic pain  Outcome: Ongoing

## 2019-04-15 VITALS
DIASTOLIC BLOOD PRESSURE: 74 MMHG | OXYGEN SATURATION: 99 % | SYSTOLIC BLOOD PRESSURE: 132 MMHG | RESPIRATION RATE: 12 BRPM | HEART RATE: 76 BPM | TEMPERATURE: 98 F

## 2019-04-15 PROBLEM — Z98.891 S/P CESAREAN SECTION: Status: ACTIVE | Noted: 2019-04-15

## 2019-04-15 PROBLEM — G89.18 POST-OP PAIN: Status: ACTIVE | Noted: 2019-04-15

## 2019-04-15 PROCEDURE — 6360000002 HC RX W HCPCS: Performed by: OBSTETRICS & GYNECOLOGY

## 2019-04-15 PROCEDURE — 6370000000 HC RX 637 (ALT 250 FOR IP): Performed by: OBSTETRICS & GYNECOLOGY

## 2019-04-15 RX ORDER — HYDROCODONE BITARTRATE AND ACETAMINOPHEN 5; 325 MG/1; MG/1
1 TABLET ORAL EVERY 6 HOURS PRN
Qty: 28 TABLET | Refills: 0 | Status: SHIPPED | OUTPATIENT
Start: 2019-04-15 | End: 2019-04-22

## 2019-04-15 RX ADMIN — DOCUSATE SODIUM 100 MG: 100 CAPSULE, LIQUID FILLED ORAL at 09:02

## 2019-04-15 RX ADMIN — PRENATAL VIT W/ FE FUMARATE-FA TAB 27-0.8 MG 1 TABLET: 27-0.8 TAB at 09:15

## 2019-04-15 RX ADMIN — IBUPROFEN 800 MG: 400 TABLET ORAL at 05:55

## 2019-04-15 RX ADMIN — LIOTHYRONINE SODIUM 5 MCG: 5 TABLET ORAL at 09:16

## 2019-04-15 RX ADMIN — ENOXAPARIN SODIUM 40 MG: 40 INJECTION SUBCUTANEOUS at 09:15

## 2019-04-15 RX ADMIN — LEVOTHYROXINE SODIUM 88 MCG: 88 TABLET ORAL at 09:15

## 2019-04-15 ASSESSMENT — PAIN DESCRIPTION - PAIN TYPE: TYPE: ACUTE PAIN;SURGICAL PAIN

## 2019-04-15 ASSESSMENT — PAIN DESCRIPTION - PROGRESSION: CLINICAL_PROGRESSION: GRADUALLY IMPROVING

## 2019-04-15 ASSESSMENT — PAIN SCALES - GENERAL
PAINLEVEL_OUTOF10: 0

## 2019-04-15 ASSESSMENT — PAIN - FUNCTIONAL ASSESSMENT: PAIN_FUNCTIONAL_ASSESSMENT: ACTIVITIES ARE NOT PREVENTED

## 2019-04-15 ASSESSMENT — PAIN DESCRIPTION - LOCATION: LOCATION: ABDOMEN;INCISION

## 2019-04-15 ASSESSMENT — PAIN DESCRIPTION - FREQUENCY: FREQUENCY: INTERMITTENT

## 2019-04-15 ASSESSMENT — PAIN DESCRIPTION - DESCRIPTORS: DESCRIPTORS: SORE

## 2019-04-15 ASSESSMENT — PAIN DESCRIPTION - ORIENTATION: ORIENTATION: LOWER

## 2019-04-15 ASSESSMENT — PAIN DESCRIPTION - ONSET: ONSET: GRADUAL

## 2019-04-15 NOTE — PLAN OF CARE
Problem: Infection - Surgical Site:  Goal: Will show no infection signs and symptoms  Description  Will show no infection signs and symptoms  4/15/2019 1523 by David Castillo RN  Outcome: Completed  4/15/2019 0621 by America Priest RN  Outcome: Met This Shift

## 2019-04-15 NOTE — DISCHARGE SUMMARY
Department of Obstetrics and Gynecology  Postpartum Discharge Summary      Admit Date: 2019    Admit Diagnosis: Term pregnancy [Z34.80]    Discharge Date: 4/15/2019    Discharge Diagnoses: primary C section     [facility] Medication Instructions [id number]    Printed on:04/15/19 2476   Medication Information                      CYTOMEL 5 MCG tablet  Take 1 tablet by mouth daily             HYDROcodone-acetaminophen (NORCO) 5-325 MG per tablet  Take 1 tablet by mouth every 6 hours as needed for Pain for up to 7 days. Prenatal Vit-Fe Fumarate-FA (PRENATAL VITAMIN PO)  Take 1 tablet by mouth daily             SYNTHROID 137 MCG tablet  Take 1 tablet daily minus 1 tablet a month             SYNTHROID 88 MCG tablet  Take 1 tablet by mouth every morning (before breakfast)                 Service: Obstetrics    Consults: none    Significant Diagnostic Studies: none    Postpartum complications: none     Condition at Discharge: good    Hospital Course: uncomplicated    Discharge Instructions: Activity: no sex for 6 weeks, no driving while on analgesics, no heavy lifting for 6 weeks and as tolerated    Diet: regular diet    Instructions: No intercourse and nothing in the vagina for 6 weeks. Do not drive while using pain medications.  Keep any wounds clean and dry    Discharge to: Home    Disposition / Follow up: Return to office in 6 weeks    Home Health Nurse visit within 24-48 h if qualifies    [place] Data:  Weight   Information for the patient's :  Trina Castleman [[id number]]        Apgars   Information for the patient's :  Trina Castleman [[id number]]         Home with mother    Electronically signed by Jesse Gentile MD on 4/15/2019 at 2:51 PM

## 2019-04-15 NOTE — LACTATION NOTE
This note was copied from a baby's chart. LC to room. I gave and reviewed hand out for reverse pressure softening. I taught and mother returned demo for improving latch when engorged. Encouraged use of other comfort measures including applying cold packs for 15-20 minutes after feedings 2-3 times per day, NSAIDs as prescribed and hand expression when necessary. Mother going home on nipple shield care plan. I discussed in length the temporary use of a nipple shield with mother. I gave and reviewed Care Plan with mother. I reviewed cleaning, application, and risks and benefits including possible impaired milk intake by infant and impaired milk production of mother. I educated mother to use a breast pump after each feeding with nipple shield. I stressed the importance of follow up with her pediatrician and Lactation. I gave instructions and tips on weaning from nipple shield within 1-2 weeks. Encouraged mother to call to make out patient appointment if not weaned from nipple shield within 2 weeks. Mother should attempt at the breast without shield, if infant will not latch, use nipple shield. If infant has good feeding at the breast with shield, pump for 15 minutes and offer supplement. If infant has a poor feeding or no feeding at the breast, pump for 30 minutes and give supplement of either expressed breastmilk or formula via bottle with slow flow nipple. Encouraged mother to continue with skin to skin and frequent feeding attempts. Mother states understanding of all information and denies further needs at this time.

## 2019-04-15 NOTE — FLOWSHEET NOTE
Shift assessment completed as documented. - la nena's sign in bilateral calves. Pt denies pain at this time. Call light and bedside table in reach. Instructed pt to call when needed.

## 2019-04-16 LAB
BLOOD BANK DISPENSE STATUS: NORMAL
BLOOD BANK DISPENSE STATUS: NORMAL
BLOOD BANK PRODUCT CODE: NORMAL
BLOOD BANK PRODUCT CODE: NORMAL
BPU ID: NORMAL
BPU ID: NORMAL
DESCRIPTION BLOOD BANK: NORMAL
DESCRIPTION BLOOD BANK: NORMAL

## 2019-05-14 ENCOUNTER — TELEPHONE (OUTPATIENT)
Dept: ENDOCRINOLOGY | Age: 34
End: 2019-05-14

## 2019-05-14 DIAGNOSIS — E03.9 ACQUIRED HYPOTHYROIDISM: Primary | ICD-10-CM

## 2019-05-14 DIAGNOSIS — E03.9 ACQUIRED HYPOTHYROIDISM: ICD-10-CM

## 2019-05-14 LAB
A/G RATIO: 1.4 (ref 1.1–2.2)
ALBUMIN SERPL-MCNC: 4.2 G/DL (ref 3.4–5)
ALP BLD-CCNC: 99 U/L (ref 40–129)
ALT SERPL-CCNC: 17 U/L (ref 10–40)
ANION GAP SERPL CALCULATED.3IONS-SCNC: 13 MMOL/L (ref 3–16)
AST SERPL-CCNC: 17 U/L (ref 15–37)
BILIRUB SERPL-MCNC: <0.2 MG/DL (ref 0–1)
BUN BLDV-MCNC: 10 MG/DL (ref 7–20)
CALCIUM SERPL-MCNC: 9.6 MG/DL (ref 8.3–10.6)
CHLORIDE BLD-SCNC: 103 MMOL/L (ref 99–110)
CO2: 25 MMOL/L (ref 21–32)
CREAT SERPL-MCNC: 0.8 MG/DL (ref 0.6–1.1)
GFR AFRICAN AMERICAN: >60
GFR NON-AFRICAN AMERICAN: >60
GLOBULIN: 3 G/DL
GLUCOSE BLD-MCNC: 59 MG/DL (ref 70–99)
POTASSIUM SERPL-SCNC: 4.2 MMOL/L (ref 3.5–5.1)
SODIUM BLD-SCNC: 141 MMOL/L (ref 136–145)
T3 FREE: 3.8 PG/ML (ref 2.3–4.2)
T4 FREE: 1.4 NG/DL (ref 0.9–1.8)
TOTAL PROTEIN: 7.2 G/DL (ref 6.4–8.2)
TSH SERPL DL<=0.05 MIU/L-ACNC: 0.07 UIU/ML (ref 0.27–4.2)

## 2019-05-14 NOTE — TELEPHONE ENCOUNTER
Patient called and said she is at the lab to get her blood work but there are no orders in there.      Please call patient

## 2019-05-15 ENCOUNTER — OFFICE VISIT (OUTPATIENT)
Dept: ENDOCRINOLOGY | Age: 34
End: 2019-05-15
Payer: COMMERCIAL

## 2019-05-15 VITALS
DIASTOLIC BLOOD PRESSURE: 68 MMHG | OXYGEN SATURATION: 98 % | BODY MASS INDEX: 27.29 KG/M2 | WEIGHT: 164 LBS | HEART RATE: 76 BPM | SYSTOLIC BLOOD PRESSURE: 110 MMHG

## 2019-05-15 DIAGNOSIS — E03.9 ACQUIRED HYPOTHYROIDISM: Primary | ICD-10-CM

## 2019-05-15 PROCEDURE — 99213 OFFICE O/P EST LOW 20 MIN: CPT | Performed by: INTERNAL MEDICINE

## 2019-05-15 NOTE — PROGRESS NOTES
TONSILLECTOMY  2014    WISDOM TOOTH EXTRACTION Bilateral 2003     Family History   Problem Relation Age of Onset    High Cholesterol Mother     High Blood Pressure Father     Cancer Paternal Uncle         esophageal    Diabetes Paternal Grandmother     Atrial Fibrillation Paternal Grandmother     Heart Disease Paternal Grandmother     High Blood Pressure Paternal Grandmother     High Cholesterol Paternal Grandmother     High Cholesterol Maternal Grandfather     Prostate Cancer Maternal Grandfather      Social History     Socioeconomic History    Marital status:      Spouse name: None    Number of children: None    Years of education: None    Highest education level: None   Occupational History    None   Social Needs    Financial resource strain: None    Food insecurity:     Worry: None     Inability: None    Transportation needs:     Medical: None     Non-medical: None   Tobacco Use    Smoking status: Never Smoker    Smokeless tobacco: Never Used   Substance and Sexual Activity    Alcohol use: Not Currently     Comment: soccially    Drug use: No    Sexual activity: Yes     Partners: Male   Lifestyle    Physical activity:     Days per week: None     Minutes per session: None    Stress: None   Relationships    Social connections:     Talks on phone: None     Gets together: None     Attends Rastafarian service: None     Active member of club or organization: None     Attends meetings of clubs or organizations: None     Relationship status: None    Intimate partner violence:     Fear of current or ex partner: None     Emotionally abused: None     Physically abused: None     Forced sexual activity: None   Other Topics Concern    None   Social History Narrative    ** Merged History Encounter **          Current Outpatient Medications   Medication Sig Dispense Refill    Prenatal Vit-Fe Fumarate-FA (PRENATAL VITAMIN PO) Take 1 tablet by mouth daily      CYTOMEL 5 MCG tablet Take 1 tablet by mouth daily 90 tablet 0    SYNTHROID 88 MCG tablet Take 1 tablet by mouth every morning (before breakfast) 90 tablet 0     No current facility-administered medications for this visit.       Allergies   Allergen Reactions    Percocet [Oxycodone-Acetaminophen]      Migraine     Family Status   Relation Name Status    Mother  Alive    Father  [de-identified]    Sister  Alive    PUnc  (Not Specified)    PGM  (Not Specified)    MGF  (Not Specified)       Review of Systems:  Constitutional: has fatigue, no fever, no recent weight gain, no recent weight loss, no changes in appetite  Eyes: no eye pain, no change in vision, no eye redness, no eye irritation, no double vision  Ears, nose, throat: no nasal congestion, no sore throat, no earache, no decrease in hearing, no hoarseness, no dry mouth, no sinus problems, no difficulty swallowing, no neck lumps, no dental problems, no mouth sores, no ringing in ears  Pulmonary: no shortness of breath, no wheezing, no dyspnea on exertion, no cough  Cardiovascular: no chest pain, no lower extremity edema, no orthopnea, no intermittent leg claudication, no palpitations  Gastrointestinal: no abdominal pain, has nausea, has vomiting, no diarrhea, no constipation, no heartburn, no bloating  Genitourinary: no dysuria, no urinary incontinence, no urinary hesitancy, no change in urinary frequency, no feelings of urinary urgency, no nocturia  Musculoskeletal: no joint swelling, no joint stiffness, no joint pain, no muscle cramps, no muscle pain, no bone pain  Integument/Breast: no hair loss, no skin rashes, no skin lesions, no itching, has dry skin  Neurological: no numbness, no tingling, no weakness, no confusion, no headaches, no dizziness, no fainting, no tremors, no decrease in memory, no balance problems  Psychiatric: no anxiety, no depression, no insomnia  Hematologic/Lymphatic: no tendency for easy bleeding, no swollen lymph nodes, no tendency for easy bruising  Immunology: no seasonal allergies, no frequent infections, no frequent illnesses  Endocrine: no temperature intolerance, no hot flashes, no hand tremor    OBJECTIVE:   /68 (Site: Right Upper Arm, Position: Sitting, Cuff Size: Medium Adult)   Pulse 76   Wt 164 lb (74.4 kg)   LMP 07/09/2018   SpO2 98%   BMI 27.29 kg/m²   Wt Readings from Last 3 Encounters:   05/15/19 164 lb (74.4 kg)   01/16/19 176 lb 9.6 oz (80.1 kg)   11/01/18 164 lb 12.8 oz (74.8 kg)       Physical Exam:  Constitutional: no acute distress, well appearing, well nourished  Psychiatric: oriented to person, place and time, judgement, insight and normal, recent and remote memory and intact and mood, affect are normal  Skin: skin and subcutaneous tissue is normal without mass, normal turgor  Head and Face: examination of head and face revealed no abnormalities  Eyes: no lid or conjunctival swelling, no erythema or discharge, pupils are normal, equal, round, and reactive to light  Ears/Nose: external inspection of ears and nose revealed no abnormalities, hearing is grossly normal  Oropharynx/Mouth/Face: lips, tongue and gums are normal with no lesions, the voice quality was normal  Neck: neck is supple and symmetric, with midline trachea and no masses, thyroid is normal  Lymphatics: normal cervical lymph nodes, normal supraclavicular nodes  Pulmonary: no increased work of breathing or signs of respiratory distress, lungs are clear to auscultation  Cardiovascular: normal heart rate and rhythm, normal S1 and S2, no murmurs and pedal pulses and 2+ bilaterally, No edema  Abdomen: abdomen is soft, non-tender with no masses  Musculoskeletal: normal gait and station, exam of the digits and nails are normal  Neurological: normal coordination, normal general cortical function    Lab Review:  Lab Results   Component Value Date    TSH 0.07 05/14/2019     No results found for: FREET4     ASSESSMENT/PLAN:    1.  Acquired hypothyroidism  TSH 0.07  Feels well, no palpitations,

## 2019-07-22 DIAGNOSIS — E03.9 ACQUIRED HYPOTHYROIDISM: ICD-10-CM

## 2019-07-22 RX ORDER — LIOTHYRONINE SODIUM 5 UG/1
TABLET ORAL
Qty: 90 TABLET | Refills: 0 | Status: SHIPPED | OUTPATIENT
Start: 2019-07-22 | End: 2019-10-18 | Stop reason: SDUPTHER

## 2019-07-22 RX ORDER — LEVOTHYROXINE SODIUM 88 MCG
TABLET ORAL
Qty: 90 TABLET | Refills: 0 | Status: SHIPPED | OUTPATIENT
Start: 2019-07-22 | End: 2019-10-18 | Stop reason: SDUPTHER

## 2019-08-16 ENCOUNTER — TELEPHONE (OUTPATIENT)
Dept: ENDOCRINOLOGY | Age: 34
End: 2019-08-16

## 2019-08-19 DIAGNOSIS — E03.9 ACQUIRED HYPOTHYROIDISM: ICD-10-CM

## 2019-08-19 LAB
A/G RATIO: 2.1 (ref 1.1–2.2)
ALBUMIN SERPL-MCNC: 4.6 G/DL (ref 3.4–5)
ALP BLD-CCNC: 60 U/L (ref 40–129)
ALT SERPL-CCNC: 13 U/L (ref 10–40)
ANION GAP SERPL CALCULATED.3IONS-SCNC: 12 MMOL/L (ref 3–16)
AST SERPL-CCNC: 14 U/L (ref 15–37)
BILIRUB SERPL-MCNC: <0.2 MG/DL (ref 0–1)
BUN BLDV-MCNC: 8 MG/DL (ref 7–20)
CALCIUM SERPL-MCNC: 9.7 MG/DL (ref 8.3–10.6)
CHLORIDE BLD-SCNC: 106 MMOL/L (ref 99–110)
CO2: 23 MMOL/L (ref 21–32)
CREAT SERPL-MCNC: 0.8 MG/DL (ref 0.6–1.1)
GFR AFRICAN AMERICAN: >60
GFR NON-AFRICAN AMERICAN: >60
GLOBULIN: 2.2 G/DL
GLUCOSE BLD-MCNC: 107 MG/DL (ref 70–99)
POTASSIUM SERPL-SCNC: 4.4 MMOL/L (ref 3.5–5.1)
SODIUM BLD-SCNC: 141 MMOL/L (ref 136–145)
T3 FREE: 2.9 PG/ML (ref 2.3–4.2)
T4 FREE: 1.3 NG/DL (ref 0.9–1.8)
TOTAL PROTEIN: 6.8 G/DL (ref 6.4–8.2)
TSH SERPL DL<=0.05 MIU/L-ACNC: 0.12 UIU/ML (ref 0.27–4.2)

## 2019-08-21 ENCOUNTER — OFFICE VISIT (OUTPATIENT)
Dept: ENDOCRINOLOGY | Age: 34
End: 2019-08-21
Payer: COMMERCIAL

## 2019-08-21 VITALS
BODY MASS INDEX: 27.42 KG/M2 | HEIGHT: 64 IN | DIASTOLIC BLOOD PRESSURE: 72 MMHG | HEART RATE: 79 BPM | OXYGEN SATURATION: 99 % | SYSTOLIC BLOOD PRESSURE: 106 MMHG | WEIGHT: 160.6 LBS

## 2019-08-21 DIAGNOSIS — E03.9 ACQUIRED HYPOTHYROIDISM: Primary | ICD-10-CM

## 2019-08-21 PROCEDURE — 99213 OFFICE O/P EST LOW 20 MIN: CPT | Performed by: INTERNAL MEDICINE

## 2019-08-21 NOTE — PROGRESS NOTES
SUBJECTIVE:  Sanam Best is a 34 y.o. female who is here for hypothyroidism. 1. Acquired hypothyroidism    This started in . Patient was diagnosed with hypothyroidism. The problem has been unchanged. Patient started medication in . Currently patient is on: Synthroid, Cytomel. Misses  0 doses a month. Current complaints: dry skin. No nausea, vomiting. Josh [place] History of obstructive symptoms: difficulty swallowing No, changes in voice/hoarseness No.  Had miscarriage in 2017. History of radiation to patient's neck: No  Resent iodine exposure: No  Family history includes no thyroid abnormalities. Family history of thyroid cancer: No      ULTRASOUND THYROID GLAND       HISTORY: Goiter, throat pain.       Real time sonographic imaging of the thyroid gland performed.       FINDINGS:       The right lobe measures 4.4 cm and left lobe measures 4.1 cm. The   isthmus is normal at 2 mm.       There are no colloid cysts or nodules present.           Impression   IMPRESSION:        Gland size within normal range.  No cysts or nodules.             Past Medical History:   Diagnosis Date    Acne     Bicornate uterus     Hypothyroidism     Infertility, female     IUI    Migraines     PCOS (polycystic ovarian syndrome)     Uterine fibroid      Patient Active Problem List    Diagnosis Date Noted    S/P  section 04/15/2019    Post-op pain 04/15/2019    Bicornuate uterus 2019    Pregnancy 2018    History of miscarriage 2018    Hypothyroidism 2015    Endometriosis 2015    Uterine fibroid 2015    Acne     Uterine fibroid 2012     Past Surgical History:   Procedure Laterality Date    BREAST ENHANCEMENT SURGERY      BREAST SURGERY  2010     SECTION N/A 2019    Primary  SECTION low transverse uterine incision at 1154 performed by Juliana Hanson MD at [address] L&D OR    DILATION AND CURETTAGE OF UTERUS      x 2 with diag lap    TONSILLECTOMY  2014    WISDOM TOOTH EXTRACTION Bilateral 2003     Family History   Problem Relation Age of Onset    High Cholesterol Mother     High Blood Pressure Father     Cancer Paternal Uncle         esophageal    Diabetes Paternal Grandmother     Atrial Fibrillation Paternal Grandmother     Heart Disease Paternal Grandmother     High Blood Pressure Paternal Grandmother     High Cholesterol Paternal Grandmother     High Cholesterol Maternal Grandfather     Prostate Cancer Maternal Grandfather      Social History     Socioeconomic History    Marital status:      Spouse name: None    Number of children: None    Years of education: None    Highest education level: None   Occupational History    None   Social Needs    Financial resource strain: None    Food insecurity:     Worry: None     Inability: None    Transportation needs:     Medical: None     Non-medical: None   Tobacco Use    Smoking status: Never Smoker    Smokeless tobacco: Never Used   Substance and Sexual Activity    Alcohol use: Not Currently     Comment: soccially    Drug use: No    Sexual activity: Yes     Partners: Male   Lifestyle    Physical activity:     Days per week: None     Minutes per session: None    Stress: None   Relationships    Social connections:     Talks on phone: None     Gets together: None     Attends [employer] service: None     Active member of club or organization: None     Attends meetings of clubs or organizations: None     Relationship status: None    Intimate partner violence:     Fear of current or ex partner: None     Emotionally abused: None     Physically abused: None     Forced sexual activity: None   Other Topics Concern    None   Social History Narrative    ** Merged History Encounter **          Current Outpatient Medications   Medication Sig Dispense Refill    liothyronine (CYTOMEL) 5 MCG tablet TAKE 1 TABLET BY MOUTH ONE TIME A DAY 90 tablet 0    SYNTHROID 88 MCG tablet TAKE ONE TABLET BY MOUTH EVERY MORNING BEFORE BREAKFAST 90 tablet 0    Prenatal Vit-Fe Fumarate-FA (PRENATAL VITAMIN PO) Take 1 tablet by mouth daily       No current facility-administered medications for this visit.       Allergies   Allergen Reactions    Percocet [Oxycodone-Acetaminophen]      Migraine     Family Status   Relation Name Status    Mother  Alive    Father  [de-identified]    Sister  Alive    PUnc  (Not Specified)    PGM  (Not Specified)    MGF  (Not Specified)       Review of Systems:  Constitutional: has fatigue, no fever, no recent weight gain, no recent weight loss, no changes in appetite  Eyes: no eye pain, no change in vision, no eye redness, no eye irritation, no double vision  Ears, nose, throat: no nasal congestion, no sore throat, no earache, no decrease in hearing, no hoarseness, no dry mouth, no sinus problems, no difficulty swallowing, no neck lumps, no dental problems, no mouth sores, no ringing in ears  Pulmonary: no shortness of breath, no wheezing, no dyspnea on exertion, no cough  Cardiovascular: no chest pain, no lower extremity edema, no orthopnea, no intermittent leg claudication, no palpitations  Gastrointestinal: no abdominal pain, has nausea, has vomiting, no diarrhea, no constipation, no heartburn, no bloating  Genitourinary: no dysuria, no urinary incontinence, no urinary hesitancy, no change in urinary frequency, no feelings of urinary urgency, no nocturia  Musculoskeletal: no joint swelling, no joint stiffness, no joint pain, no muscle cramps, no muscle pain, no bone pain  Integument/Breast: no hair loss, no skin rashes, no skin lesions, no itching, has dry skin  Neurological: no numbness, no tingling, no weakness, no confusion, no headaches, no dizziness, no fainting, no tremors, no decrease in memory, no balance problems  Psychiatric: no anxiety, no depression, no insomnia  Hematologic/Lymphatic: no tendency for easy bleeding, no swollen lymph nodes, no tendency for easy bruising  Immunology: no seasonal allergies, no frequent infections, no frequent illnesses  Endocrine: no temperature intolerance, no hot flashes, no hand tremor    OBJECTIVE:   /72 (Site: Left Upper Arm, Position: Sitting, Cuff Size: Medium Adult)   Pulse 79   Ht 5' 4\" (1.626 m)   Wt 160 lb 9.6 oz (72.8 kg)   LMP 07/31/2019   SpO2 99%   BMI 27.57 kg/m²   Wt Readings from Last 3 Encounters:   08/21/19 160 lb 9.6 oz (72.8 kg)   05/15/19 164 lb (74.4 kg)   01/16/19 176 lb 9.6 oz (80.1 kg)       Physical Exam:  Constitutional: no acute distress, well appearing, well nourished  Psychiatric: oriented to person, place and time, judgement, insight and normal, recent and remote memory and intact and mood, affect are normal  Skin: skin and subcutaneous tissue is normal without mass, normal turgor  Head and Face: examination of head and face revealed no abnormalities  Eyes: no lid or conjunctival swelling, no erythema or discharge, pupils are normal, equal, round, and reactive to light  Ears/Nose: external inspection of ears and nose revealed no abnormalities, hearing is grossly normal  Oropharynx/Mouth/Face: lips, tongue and gums are normal with no lesions, the voice quality was normal  Neck: neck is supple and symmetric, with midline trachea and no masses, thyroid is normal  Lymphatics: normal cervical lymph nodes, normal supraclavicular nodes  Pulmonary: no increased work of breathing or signs of respiratory distress, lungs are clear to auscultation  Cardiovascular: normal heart rate and rhythm, normal S1 and S2, no murmurs and pedal pulses and 2+ bilaterally, No edema  Abdomen: abdomen is soft, non-tender with no masses  Musculoskeletal: normal gait and station, exam of the digits and nails are normal  Neurological: normal coordination, normal general cortical function    Lab Review:  Lab Results   Component Value Date    TSH 0.12 08/19/2019     No results found for: FREET4     ASSESSMENT/PLAN:    1.

## 2019-10-18 DIAGNOSIS — E03.9 ACQUIRED HYPOTHYROIDISM: ICD-10-CM

## 2019-10-18 RX ORDER — LIOTHYRONINE SODIUM 5 UG/1
TABLET ORAL
Qty: 90 TABLET | Refills: 0 | Status: SHIPPED | OUTPATIENT
Start: 2019-10-18 | End: 2019-11-05 | Stop reason: SDUPTHER

## 2019-10-18 RX ORDER — LEVOTHYROXINE SODIUM 88 MCG
TABLET ORAL
Qty: 90 TABLET | Refills: 0 | Status: SHIPPED | OUTPATIENT
Start: 2019-10-18 | End: 2019-11-05 | Stop reason: SDUPTHER

## 2019-11-04 DIAGNOSIS — E03.9 ACQUIRED HYPOTHYROIDISM: ICD-10-CM

## 2019-11-04 LAB
T3 TOTAL: 0.84 NG/ML (ref 0.8–2)
T4 FREE: 1.2 NG/DL (ref 0.9–1.8)
TSH SERPL DL<=0.05 MIU/L-ACNC: 2.28 UIU/ML (ref 0.27–4.2)

## 2019-11-05 ENCOUNTER — TELEPHONE (OUTPATIENT)
Dept: ENDOCRINOLOGY | Age: 34
End: 2019-11-05

## 2019-11-05 DIAGNOSIS — E03.9 ACQUIRED HYPOTHYROIDISM: ICD-10-CM

## 2019-11-05 RX ORDER — LEVOTHYROXINE SODIUM 88 MCG
TABLET ORAL
Qty: 90 TABLET | Refills: 1 | Status: SHIPPED | OUTPATIENT
Start: 2019-11-05 | End: 2020-01-06 | Stop reason: SDUPTHER

## 2019-11-05 RX ORDER — LIOTHYRONINE SODIUM 5 UG/1
TABLET ORAL
Qty: 90 TABLET | Refills: 1 | Status: SHIPPED | OUTPATIENT
Start: 2019-11-05 | End: 2020-01-06 | Stop reason: SDUPTHER

## 2020-01-06 RX ORDER — LIOTHYRONINE SODIUM 5 UG/1
TABLET ORAL
Qty: 90 TABLET | Refills: 1 | Status: SHIPPED | OUTPATIENT
Start: 2020-01-06 | End: 2020-07-10

## 2020-01-06 RX ORDER — LEVOTHYROXINE SODIUM 88 MCG
TABLET ORAL
Qty: 90 TABLET | Refills: 1 | Status: SHIPPED | OUTPATIENT
Start: 2020-01-06 | End: 2020-01-21 | Stop reason: SDUPTHER

## 2020-01-20 NOTE — TELEPHONE ENCOUNTER
[facility] called and pt is there to  her Synthroid rx. It's going to be over $100 has [facility] on rx.  Pt would like generic  CB# [phone] [facility]

## 2020-01-21 RX ORDER — LEVOTHYROXINE SODIUM 88 UG/1
TABLET ORAL
Qty: 90 TABLET | Refills: 1 | Status: SHIPPED | OUTPATIENT
Start: 2020-01-21 | End: 2020-07-10

## 2020-02-10 DIAGNOSIS — E03.9 ACQUIRED HYPOTHYROIDISM: ICD-10-CM

## 2020-02-10 LAB
A/G RATIO: 2.1 (ref 1.1–2.2)
ALBUMIN SERPL-MCNC: 4.4 G/DL (ref 3.4–5)
ALP BLD-CCNC: 60 U/L (ref 40–129)
ALT SERPL-CCNC: 10 U/L (ref 10–40)
ANION GAP SERPL CALCULATED.3IONS-SCNC: 13 MMOL/L (ref 3–16)
AST SERPL-CCNC: 13 U/L (ref 15–37)
BILIRUB SERPL-MCNC: <0.2 MG/DL (ref 0–1)
BUN BLDV-MCNC: 11 MG/DL (ref 7–20)
CALCIUM SERPL-MCNC: 9.4 MG/DL (ref 8.3–10.6)
CHLORIDE BLD-SCNC: 104 MMOL/L (ref 99–110)
CO2: 25 MMOL/L (ref 21–32)
CREAT SERPL-MCNC: 0.8 MG/DL (ref 0.6–1.1)
GFR AFRICAN AMERICAN: >60
GFR NON-AFRICAN AMERICAN: >60
GLOBULIN: 2.1 G/DL
GLUCOSE BLD-MCNC: 89 MG/DL (ref 70–99)
POTASSIUM SERPL-SCNC: 4.4 MMOL/L (ref 3.5–5.1)
SODIUM BLD-SCNC: 142 MMOL/L (ref 136–145)
T3 FREE: 2.7 PG/ML (ref 2.3–4.2)
T3 TOTAL: 0.89 NG/ML (ref 0.8–2)
T4 FREE: 1.1 NG/DL (ref 0.9–1.8)
T4 TOTAL: 6.6 UG/DL (ref 4.5–10.9)
TOTAL PROTEIN: 6.5 G/DL (ref 6.4–8.2)
TSH SERPL DL<=0.05 MIU/L-ACNC: 1.02 UIU/ML (ref 0.27–4.2)

## 2020-02-12 ENCOUNTER — OFFICE VISIT (OUTPATIENT)
Dept: ENDOCRINOLOGY | Age: 35
End: 2020-02-12
Payer: COMMERCIAL

## 2020-02-12 VITALS
DIASTOLIC BLOOD PRESSURE: 53 MMHG | SYSTOLIC BLOOD PRESSURE: 107 MMHG | OXYGEN SATURATION: 97 % | BODY MASS INDEX: 26.91 KG/M2 | HEIGHT: 64 IN | WEIGHT: 157.6 LBS | HEART RATE: 74 BPM

## 2020-02-12 PROCEDURE — G8419 CALC BMI OUT NRM PARAM NOF/U: HCPCS | Performed by: INTERNAL MEDICINE

## 2020-02-12 PROCEDURE — G8484 FLU IMMUNIZE NO ADMIN: HCPCS | Performed by: INTERNAL MEDICINE

## 2020-02-12 PROCEDURE — 99213 OFFICE O/P EST LOW 20 MIN: CPT | Performed by: INTERNAL MEDICINE

## 2020-02-12 PROCEDURE — G8427 DOCREV CUR MEDS BY ELIG CLIN: HCPCS | Performed by: INTERNAL MEDICINE

## 2020-02-12 PROCEDURE — 1036F TOBACCO NON-USER: CPT | Performed by: INTERNAL MEDICINE

## 2020-02-12 NOTE — PROGRESS NOTES
BREAKFAST 90 tablet 1    liothyronine (CYTOMEL) 5 MCG tablet TAKE 1 TABLET BY MOUTH ONE TIME A DAY 90 tablet 1    Prenatal Vit-Fe Fumarate-FA (PRENATAL VITAMIN PO) Take 1 tablet by mouth daily       No current facility-administered medications for this visit.       Allergies   Allergen Reactions    Percocet [Oxycodone-Acetaminophen]      Migraine     Family Status   Relation Name Status    Mother  Alive    Father  [de-identified]    Sister  Alive    PUnc  (Not Specified)    PGM  (Not Specified)    MGF  (Not Specified)       Review of Systems:  Constitutional: has fatigue, no fever, no recent weight gain, no recent weight loss, no changes in appetite  Eyes: no eye pain, no change in vision, no eye redness, no eye irritation, no double vision  Ears, nose, throat: no nasal congestion, no sore throat, no earache, no decrease in hearing, no hoarseness, no dry mouth, no sinus problems, no difficulty swallowing, no neck lumps, no dental problems, no mouth sores, no ringing in ears  Pulmonary: no shortness of breath, no wheezing, no dyspnea on exertion, no cough  Cardiovascular: no chest pain, no lower extremity edema, no orthopnea, no intermittent leg claudication, no palpitations  Gastrointestinal: no abdominal pain, has nausea, has vomiting, no diarrhea, no constipation, no heartburn, no bloating  Genitourinary: no dysuria, no urinary incontinence, no urinary hesitancy, no change in urinary frequency, no feelings of urinary urgency, no nocturia  Musculoskeletal: no joint swelling, no joint stiffness, no joint pain, no muscle cramps, no muscle pain, no bone pain  Integument/Breast: no hair loss, no skin rashes, no skin lesions, no itching, has dry skin  Neurological: no numbness, no tingling, no weakness, no confusion, no headaches, no dizziness, no fainting, no tremors, no decrease in memory, no balance problems  Psychiatric: no anxiety, no depression, no insomnia  Hematologic/Lymphatic: no tendency for easy bleeding, no

## 2020-06-09 ENCOUNTER — HOSPITAL ENCOUNTER (OUTPATIENT)
Dept: ULTRASOUND IMAGING | Age: 35
Discharge: HOME | End: 2020-06-09
Payer: COMMERCIAL

## 2020-06-09 PROCEDURE — 76642 ULTRASOUND BREAST LIMITED: CPT

## 2020-07-10 RX ORDER — LEVOTHYROXINE SODIUM 88 UG/1
TABLET ORAL
Qty: 90 TABLET | Refills: 0 | Status: SHIPPED | OUTPATIENT
Start: 2020-07-10 | End: 2020-08-12

## 2020-07-10 RX ORDER — LIOTHYRONINE SODIUM 5 UG/1
TABLET ORAL
Qty: 90 TABLET | Refills: 0 | Status: SHIPPED | OUTPATIENT
Start: 2020-07-10 | End: 2020-08-12

## 2020-07-10 NOTE — TELEPHONE ENCOUNTER
LOV: 2/12/20  NOV: 8/12/20        Dose: 1 tablet QD  Strength: 5 mcg  Route: Oral         Dose: 1 tablet QD  Strength: 88 mcg  Route: Oral

## 2020-08-10 DIAGNOSIS — E03.9 ACQUIRED HYPOTHYROIDISM: ICD-10-CM

## 2020-08-10 LAB
A/G RATIO: 1.6 (ref 1.1–2.2)
ALBUMIN SERPL-MCNC: 4.3 G/DL (ref 3.4–5)
ALP BLD-CCNC: 58 U/L (ref 40–129)
ALT SERPL-CCNC: 11 U/L (ref 10–40)
ANION GAP SERPL CALCULATED.3IONS-SCNC: 10 MMOL/L (ref 3–16)
AST SERPL-CCNC: 16 U/L (ref 15–37)
BILIRUB SERPL-MCNC: <0.2 MG/DL (ref 0–1)
BUN BLDV-MCNC: 11 MG/DL (ref 7–20)
CALCIUM SERPL-MCNC: 9 MG/DL (ref 8.3–10.6)
CHLORIDE BLD-SCNC: 102 MMOL/L (ref 99–110)
CO2: 26 MMOL/L (ref 21–32)
CREAT SERPL-MCNC: 0.7 MG/DL (ref 0.6–1.1)
GFR AFRICAN AMERICAN: >60
GFR NON-AFRICAN AMERICAN: >60
GLOBULIN: 2.7 G/DL
GLUCOSE BLD-MCNC: 75 MG/DL (ref 70–99)
POTASSIUM SERPL-SCNC: 4.4 MMOL/L (ref 3.5–5.1)
SODIUM BLD-SCNC: 138 MMOL/L (ref 136–145)
T3 FREE: 2.7 PG/ML (ref 2.3–4.2)
T4 FREE: 1.2 NG/DL (ref 0.9–1.8)
TOTAL PROTEIN: 7 G/DL (ref 6.4–8.2)
TSH SERPL DL<=0.05 MIU/L-ACNC: 0.71 UIU/ML (ref 0.27–4.2)

## 2020-08-12 ENCOUNTER — OFFICE VISIT (OUTPATIENT)
Dept: ENDOCRINOLOGY | Age: 35
End: 2020-08-12
Payer: COMMERCIAL

## 2020-08-12 PROCEDURE — G8427 DOCREV CUR MEDS BY ELIG CLIN: HCPCS | Performed by: INTERNAL MEDICINE

## 2020-08-12 PROCEDURE — G8419 CALC BMI OUT NRM PARAM NOF/U: HCPCS | Performed by: INTERNAL MEDICINE

## 2020-08-12 PROCEDURE — 99213 OFFICE O/P EST LOW 20 MIN: CPT | Performed by: INTERNAL MEDICINE

## 2020-08-12 PROCEDURE — 1036F TOBACCO NON-USER: CPT | Performed by: INTERNAL MEDICINE

## 2020-08-12 RX ORDER — LIOTHYRONINE SODIUM 5 UG/1
TABLET ORAL
Qty: 90 TABLET | Refills: 3 | Status: SHIPPED | OUTPATIENT
Start: 2020-08-12 | End: 2021-08-24

## 2020-08-12 RX ORDER — LEVOTHYROXINE SODIUM 88 UG/1
TABLET ORAL
Qty: 90 TABLET | Refills: 3 | Status: SHIPPED | OUTPATIENT
Start: 2020-08-12 | End: 2021-08-24

## 2020-08-12 NOTE — PROGRESS NOTES
SUBJECTIVE:  Chana Parada is a 30 y.o. female who is here for hypothyroidism. 1. Acquired hypothyroidism    This started in . Patient was diagnosed with hypothyroidism. The problem has been unchanged. Patient started medication in . Currently patient is on: Synthroid, Cytomel. Misses  0 doses a month. Current complaints: dry skin, fatigue    History of obstructive symptoms: difficulty swallowing No, changes in voice/hoarseness No.  Had miscarriage in 2017. History of radiation to patient's neck: No  Resent iodine exposure: No  Family history includes no thyroid abnormalities. Family history of thyroid cancer: No      ULTRASOUND THYROID GLAND       HISTORY: Goiter, throat pain.       Real time sonographic imaging of the thyroid gland performed.       FINDINGS:       The right lobe measures 4.4 cm and left lobe measures 4.1 cm. The   isthmus is normal at 2 mm.       There are no colloid cysts or nodules present.           Impression   IMPRESSION:        Gland size within normal range.  No cysts or nodules.             Past Medical History:   Diagnosis Date    Acne     Bicornate uterus     Hypothyroidism     Infertility, female     IUI    Migraines     PCOS (polycystic ovarian syndrome)     Uterine fibroid      Patient Active Problem List    Diagnosis Date Noted    S/P  section 04/15/2019    Post-op pain 04/15/2019    Bicornuate uterus 2019    Pregnancy 2018    History of miscarriage 2018    Hypothyroidism 2015    Endometriosis 2015    Uterine fibroid 2015    Acne     Uterine fibroid 2012     Past Surgical History:   Procedure Laterality Date    BREAST ENHANCEMENT SURGERY      BREAST SURGERY       SECTION N/A 2019    Primary  SECTION low transverse uterine incision at 1154 performed by Tayla Vanegas MD at [address] L&D OR    DILATION AND CURETTAGE OF UTERUS      x 2 with diag lap    TONSILLECTOMY  2014    WISDOM TOOTH EXTRACTION Bilateral 2003     Family History   Problem Relation Age of Onset    High Cholesterol Mother     High Blood Pressure Father     Cancer Paternal Uncle         esophageal    Diabetes Paternal Grandmother     Atrial Fibrillation Paternal Grandmother     Heart Disease Paternal Grandmother     High Blood Pressure Paternal Grandmother     High Cholesterol Paternal Grandmother     High Cholesterol Maternal Grandfather     Prostate Cancer Maternal Grandfather      Social History     Socioeconomic History    Marital status:      Spouse name: None    Number of children: None    Years of education: None    Highest education level: None   Occupational History    None   Social Needs    Financial resource strain: None    Food insecurity     Worry: None     Inability: None    Transportation needs     Medical: None     Non-medical: None   Tobacco Use    Smoking status: Never Smoker    Smokeless tobacco: Never Used   Substance and Sexual Activity    Alcohol use: Not Currently     Comment: soccially    Drug use: No    Sexual activity: Yes     Partners: Male   Lifestyle    Physical activity     Days per week: None     Minutes per session: None    Stress: None   Relationships    Social connections     Talks on phone: None     Gets together: None     Attends Pent[employer] service: None     Active member of club or organization: None     Attends meetings of clubs or organizations: None     Relationship status: None    Intimate partner violence     Fear of current or ex partner: None     Emotionally abused: None     Physically abused: None     Forced sexual activity: None   Other Topics Concern    None   Social History Narrative    ** Merged History Encounter **          Current Outpatient Medications   Medication Sig Dispense Refill    levothyroxine (SYNTHROID) 88 MCG tablet TAKE 1 TABLET BY MOUTH EVERY MORNING BEFORE BREAKFAST 90 tablet 3    liothyronine (CYTOMEL) 5 MCG tablet TAKE 1 TABLET BY MOUTH EVERY DAY 90 tablet 3    VITAMIN D PO Take by mouth      Prenatal Vit-Fe Fumarate-FA (PRENATAL VITAMIN PO) Take 1 tablet by mouth daily       No current facility-administered medications for this visit.       Allergies   Allergen Reactions    Percocet [Oxycodone-Acetaminophen]      Migraine     Family Status   Relation Name Status    Mother  Alive    Father  [de-identified]    Sister  Alive    PUnc  (Not Specified)    PGM  (Not Specified)    MGF  (Not Specified)       Review of Systems:  Constitutional: has fatigue, no fever, no recent weight gain, no recent weight loss, no changes in appetite  Eyes: no eye pain, no change in vision, no eye redness, no eye irritation, no double vision  Ears, nose, throat: no nasal congestion, no sore throat, no earache, no decrease in hearing, no hoarseness, no dry mouth, no sinus problems, no difficulty swallowing, no neck lumps, no dental problems, no mouth sores, no ringing in ears  Pulmonary: no shortness of breath, no wheezing, no dyspnea on exertion, no cough  Cardiovascular: no chest pain, no lower extremity edema, no orthopnea, no intermittent leg claudication, no palpitations  Gastrointestinal: no abdominal pain, has nausea, has vomiting, no diarrhea, no constipation, no heartburn, no bloating  Genitourinary: no dysuria, no urinary incontinence, no urinary hesitancy, no change in urinary frequency, no feelings of urinary urgency, no nocturia  Musculoskeletal: no joint swelling, no joint stiffness, no joint pain, no muscle cramps, no muscle pain, no bone pain  Integument/Breast: no hair loss, no skin rashes, no skin lesions, no itching, has dry skin  Neurological: no numbness, no tingling, no weakness, no confusion, no headaches, no dizziness, no fainting, no tremors, no decrease in memory, no balance problems  Psychiatric: no anxiety, no depression, no insomnia  Hematologic/Lymphatic: no tendency for easy bleeding, no swollen lymph nodes, no tendency for easy bruising  Immunology: no seasonal allergies, no frequent infections, no frequent illnesses  Endocrine: no temperature intolerance, no hot flashes, no hand tremor    OBJECTIVE:   BP 92/60   Pulse 81   Wt Readings from Last 3 Encounters:   02/12/20 157 lb 9.6 oz (71.5 kg)   08/21/19 160 lb 9.6 oz (72.8 kg)   05/15/19 164 lb (74.4 kg)       Physical Exam:  Constitutional: no acute distress, well appearing, well nourished  Psychiatric: oriented to person, place and time, judgement, insight and normal, recent and remote memory and intact and mood, affect are normal  Skin: skin and subcutaneous tissue is normal without mass, normal turgor  Head and Face: examination of head and face revealed no abnormalities  Eyes: no lid or conjunctival swelling, no erythema or discharge, pupils are normal, equal, round, and reactive to light  Ears/Nose: external inspection of ears and nose revealed no abnormalities, hearing is grossly normal  Oropharynx/Mouth/Face: lips, tongue and gums are normal with no lesions, the voice quality was normal  Neck: neck is supple and symmetric, with midline trachea and no masses, thyroid is normal  Lymphatics: normal cervical lymph nodes, normal supraclavicular nodes  Pulmonary: no increased work of breathing or signs of respiratory distress, lungs are clear to auscultation  Cardiovascular: normal heart rate and rhythm, normal S1 and S2, no murmurs and pedal pulses and 2+ bilaterally, No edema  Abdomen: abdomen is soft, non-tender with no masses  Musculoskeletal: normal gait and station, exam of the digits and nails are normal  Neurological: normal coordination, normal general cortical function    Lab Review:  Lab Results   Component Value Date    TSH 0.71 08/10/2020     No results found for: FREET4     ASSESSMENT/PLAN:    1.  Acquired hypothyroidism  TSH 0.07-0.12-2.2-1.02-0.71  Feels well, no palpitations, anxiety, insomnia  Continue same dose, adjust next yoav if indicated. Cytomel 5 mcg  Synthroid 0.088 mg daily    - T4, Free; Future  - TSH without Reflex; Future        Reviewed and/or ordered clinical lab results Yes  Reviewed and/or ordered radiology tests Yes   Reviewed and/or ordered other diagnostic tests No  Discussed test results with performing physician No  Independently reviewed image, tracing, or specimen No  Made a decision to obtain old records No  Reviewed old records Yes  Obtained history from other than patient No    Chaka GONZALES Nii was counseled regarding symptoms of hypothyroidism diagnosis, course and complications of disease if inadequately treated, side effects of medications, diagnosis, treatment options, and prognosis, risks, benefits, complications, and alternatives of treatment, labs, imaging and other studies and treatment targets and goals, thyroid medication dose adjustments. She understands instructions and counseling. Total visit time 15 min, >50% was counseling time      Return in about 1 year (around 8/12/2021) for thyroid problems.

## 2020-08-13 VITALS
HEART RATE: 81 BPM | RESPIRATION RATE: 14 BRPM | SYSTOLIC BLOOD PRESSURE: 92 MMHG | BODY MASS INDEX: 27.05 KG/M2 | DIASTOLIC BLOOD PRESSURE: 60 MMHG | HEIGHT: 64 IN

## 2020-09-14 ENCOUNTER — OFFICE VISIT (OUTPATIENT)
Dept: ORTHOPEDIC SURGERY | Age: 35
End: 2020-09-14
Payer: COMMERCIAL

## 2020-09-14 VITALS — WEIGHT: 157 LBS | BODY MASS INDEX: 26.8 KG/M2 | RESPIRATION RATE: 16 BRPM | HEIGHT: 64 IN

## 2020-09-14 PROBLEM — M25.349 CARPAL INSTABILITY: Status: ACTIVE | Noted: 2020-09-14

## 2020-09-14 PROBLEM — M25.532 LEFT WRIST PAIN: Status: ACTIVE | Noted: 2020-09-14

## 2020-09-14 PROCEDURE — G8427 DOCREV CUR MEDS BY ELIG CLIN: HCPCS | Performed by: ORTHOPAEDIC SURGERY

## 2020-09-14 PROCEDURE — G8419 CALC BMI OUT NRM PARAM NOF/U: HCPCS | Performed by: ORTHOPAEDIC SURGERY

## 2020-09-14 PROCEDURE — 99203 OFFICE O/P NEW LOW 30 MIN: CPT | Performed by: ORTHOPAEDIC SURGERY

## 2020-09-14 PROCEDURE — 1036F TOBACCO NON-USER: CPT | Performed by: ORTHOPAEDIC SURGERY

## 2020-09-14 NOTE — PATIENT INSTRUCTIONS
Instructions for Splint Use  Hand & Wrist Tendonitis or Pain      1. Wear brace for ANY activity which is known to cause you pain. .    2.  You may wear brace during daytime hours as needed for specific activities which cause symptoms    3. You may wear the brace at night for sleep if you have pain during the night. 4.  Please do not wear brace all of the time as this will only make your wrist stiff and more uncomfortable. 5.  Brace does not need to be worn tightly, only secure enough to keep it from slipping or coming out of position. Brace may be Hand Washed Only. Prior to doing so, please remove the metal insert from the small pocket. Make sure brace is completely dry prior to wearing.

## 2020-09-14 NOTE — PROGRESS NOTES
Ms. Keley Brownlee is a 30 y.o. right handed individual  who is seen today in Hand Surgical Consultation at the request of No primary care provider on file. .    She is seen today regarding wrist pain, left greater than right that  Has been present for approximately 3 years and has been worsening in the last 5 months. She reports no history of injuring her bilateral Wrist, but states that it bothers her most when she is doing pushups or yoga. She was not seen for Emergency evaluation elsewhere, radiographs were not obtained & she has not been immobilized. She reports moderate pain located in the Dorsal aspect of the wrist, no tenderness of the remaining fingers, hand, or elbow. She notes today, no neurologic symptoms in the Whole Hand. Symptoms are worsening over time. The patient's , past medical history, medications, allergies,  family history, social history, and review of systems have been updated, reviewed and have been scanned into the chart today. Physical Exam:  Ms. Randall Bales's most recent vitals:  Vitals  Resp: 16  Height: 5' 4\" (162.6 cm)  Weight: 157 lb (71.2 kg)    She is well nourished, oriented to person, place & time. She demonstrates appropriate mood and affect as well as normal gait and station. Skin: Normal in appearance, Normal Color and Free of Lesions Bilaterally   Digital range of motion is Full and equal bilaterally bilaterally  Wrist range of motion is Full and equal bilaterally bilaterally  Sensation is subjectively normal in the Whole Hand. All other digits are normally sensate bilaterally  Vascular examination reveals normal, good capillary refill and good color bilaterally. There is no acute ecchymosis bilaterally. Swelling is minimal in the Dorsal wrist.  No other digit or hand bilaterally shows sign of swelling. Muscular strength is clinically appropriate bilaterally.   Maximal pain is elicited with palpation of the Dorsal, specifically at the Scaphoid and Nii has selected to proceed with a conservative plan of treatment consisting of: the use of protective splints, activity modification, and the judicious use of over-the-counter anti-inflammatory medications if allowed by her primary care physician. An appropriately sized Removable forearm based Wrist orthosis was offered and declined. Instructions were given regarding splint use and wear as well as suggestions for use of the other modalities were discussed. I have clearly explained to her that the above outlined treatment plan should not be expected to 'cure' her Wrist instability, but we are rather treating the symptoms with which she presents. She has understood that in order to achieve more durable relief of her symptoms and to prevent future worsening or further damage, that definitive surgical treatment would be required. Ms. Shanta Modi  voiced an appropriate understanding of our discussion, the options available to her, and of the expectations of her selected  treatment. I have also discussed with Ms. Andreia Bales  the other treatment options available to her  for this condition. We have today selected to proceed with conservative management. She and I have agreed that if our current course of conservative treatment does not prove to be effective over the short term future, that she will schedule a follow-up appointment to discuss and select an alternate course of therapy including possibly injection or surgical treatment. Ms. Shanta Modi has been given a full verbal list of instructions and precautions related to her present condition. I have asked her to followup with me in the office at the prescribed time. She is also specifically requested to call or return to the office sooner if her symptoms change or worsen prior to the next scheduled appointment.

## 2020-09-21 ENCOUNTER — EMPLOYEE WELLNESS (OUTPATIENT)
Dept: OTHER | Age: 35
End: 2020-09-21

## 2020-09-21 LAB
CHOLESTEROL, TOTAL: 164 MG/DL (ref 0–199)
GLUCOSE BLD-MCNC: 93 MG/DL (ref 70–99)
HDLC SERPL-MCNC: 69 MG/DL (ref 40–60)
LDL CHOLESTEROL CALCULATED: 84 MG/DL
TRIGL SERPL-MCNC: 53 MG/DL (ref 0–150)

## 2020-10-19 VITALS — WEIGHT: 155 LBS | BODY MASS INDEX: 26.61 KG/M2

## 2021-05-13 ENCOUNTER — OFFICE VISIT (OUTPATIENT)
Dept: DERMATOLOGY | Age: 36
End: 2021-05-13
Payer: COMMERCIAL

## 2021-05-13 VITALS — TEMPERATURE: 97.9 F

## 2021-05-13 DIAGNOSIS — D22.9 MULTIPLE NEVI: ICD-10-CM

## 2021-05-13 DIAGNOSIS — Z86.018 HISTORY OF DYSPLASTIC NEVUS: ICD-10-CM

## 2021-05-13 DIAGNOSIS — D48.5 NEOPLASM OF UNCERTAIN BEHAVIOR OF SKIN: ICD-10-CM

## 2021-05-13 DIAGNOSIS — L81.4 LENTIGINES: ICD-10-CM

## 2021-05-13 DIAGNOSIS — L70.0 ACNE VULGARIS: Primary | ICD-10-CM

## 2021-05-13 DIAGNOSIS — D22.39 FIBROUS PAPULE OF NOSE: ICD-10-CM

## 2021-05-13 PROCEDURE — 1036F TOBACCO NON-USER: CPT | Performed by: DERMATOLOGY

## 2021-05-13 PROCEDURE — G8419 CALC BMI OUT NRM PARAM NOF/U: HCPCS | Performed by: DERMATOLOGY

## 2021-05-13 PROCEDURE — 11102 TANGNTL BX SKIN SINGLE LES: CPT | Performed by: DERMATOLOGY

## 2021-05-13 PROCEDURE — G8427 DOCREV CUR MEDS BY ELIG CLIN: HCPCS | Performed by: DERMATOLOGY

## 2021-05-13 PROCEDURE — 99204 OFFICE O/P NEW MOD 45 MIN: CPT | Performed by: DERMATOLOGY

## 2021-05-13 RX ORDER — SPIRONOLACTONE 25 MG/1
TABLET ORAL
Qty: 30 TABLET | Refills: 3 | Status: SHIPPED | OUTPATIENT
Start: 2021-05-13 | End: 2022-02-24

## 2021-05-13 NOTE — PROGRESS NOTES
[facility] Dermat[facility]  Rio Schuler MD  [phone]      Saniya Friedman  1985    37 y.o. female     Date of Visit: 5/13/2021    Chief Complaint: f/u moles, acne  Chief Complaint   Patient presents with    Skin Exam     FSE       HX: multi nevi    Acne     face, back, neck      Last seen:     History of Present Illness:    1. Here for f/u for acne. C/o flaring recently. Tried spirono in the past - felt that she was too dry with it so she stopped it shortly after starting. Had a baby since last seen. *No plans for more pregnancy but not on contraception. Had IUI to get pregnant. *    She was started on atralin, aczone and spirono 50 mg bid at previous visit. She previously reported that she started getting acne as a child (as early as third grade). Was on isotretinoin x several courses in the past with clearance and then flaring again after completing treatment. Had irritation and poorly tolerated multiple topicals in the past (tretinoin, BP/clinda products). She has tried oral antibiotics in the past without clearance. She was on OCP's; had IUD placed prior to pregnancy. 2, 3. She has multiple moles on the trunk and extremities, none of which are changing in size, shape or color. They're all asymptomatic. She has a family history of melanoma. At past visit: Bx of the Lower back - right of midline-mildly dysplastic mole but completely removed with bx. Rt lower back-benign nevus. She wears sunscreen regularly and denies tanning bed use. 4. C/o 2 new small papules on the nose. Asx.     5. She has a concerning lesion on the L chest x several mos. *bx x 2 of irritated papule on the L side of the nose, bx ~2015 and 2016 - read as angiofibroma. No personal hx of skin cancer. Grandfather with hx of melanoma. Review of Systems:  Gen: Feels well, good sense of health. Skin: No changing moles or lesions. No new rashes.     Past Medical History, Family History, Surgical History, Medications and Allergies reviewed. Past Medical History:   Diagnosis Date    Acne     Bicornate uterus     Hypothyroidism     Infertility, female     IUI    Migraines     PCOS (polycystic ovarian syndrome)     Uterine fibroid        Past Surgical History:   Procedure Laterality Date    BREAST ENHANCEMENT SURGERY      BREAST SURGERY       SECTION N/A 2019    Primary  SECTION low transverse uterine incision at 1154 performed by Crispin Fisher MD at [facility] L[facility]D OR    DILATION AND CURETTAGE OF UTERUS      x 2 with diag lap    TONSILLECTOMY  2014    WISDOM TOOTH EXTRACTION Bilateral        Outpatient Medications Marked as Taking for the 21 encounter (Office Visit) with Ronny Coe MD   Medication Sig Dispense Refill    levothyroxine (SYNTHROID) 88 MCG tablet TAKE 1 TABLET BY MOUTH EVERY MORNING BEFORE BREAKFAST 90 tablet 3    liothyronine (CYTOMEL) 5 MCG tablet TAKE 1 TABLET BY MOUTH EVERY DAY 90 tablet 3    VITAMIN D PO Take by mouth      Prenatal Vit-Fe Fumarate-FA (PRENATAL VITAMIN PO) Take 1 tablet by mouth daily         Allergies   Allergen Reactions    Percocet [Oxycodone-Acetaminophen]      Migraine         Physical Examination     Gen, well-appearing  The following were examined and determined to be normal: Psych/Neuro, Scalp/hair, Conjunctivae/eyelids, Gums/teeth/lips, Neck, Abdomen, RUE, LUE, Nails/digits, RLE. Breast/axilla/chest and Back. LLE  The following were examined and determined to be abnormal: Head/face   Cheeks and chin with small scattered erythematous papules and scattered closed comedones  Lower back scars clear  trunk and extremities with numerous brown macules and papules, many of which are very dark brown  Nose with two 1 mm pinkish dome-shaped papules  L chest with pearly pink papule                 Photos in media. Assessment and Plan     1.   Acne, mild inflammatory, hormonal flares  - would like to try spirono - discussed need for pregnancy prevention/contraception and risk fetal feminization if pregnant with male fetus  - start 25 mg daily  check K - baseline wnl 8-2020  educ risk K abnl, breast tenderness, dizziness, abnl menses   - deferred other trx for now    2. Benign-appearing nevi and lentigines  3. hx of dysplastic nevus; family history of melanoma  - educ re si/sx/ABCD's of MM   educ sun protection - OTC sunscreen with SPF 30-50+ recommended and reviewed usage  encouraged skin check yearly (sooner if indicated), self checks    4. FP on the nose x 2  - light ED - no extra charge  - ed shave bx/removal if growing, bleeding, sx    5. L chest - r/o BCC  - Shave biopsy performed after verbal consent obtained. Patient educated regarding risk of bleeding, infection, scar and educated on wound care. Skin cleansed with alcohol pad and site anesthetized with lido + epi. Aluminum chloride applied to site for hemostasis. Petrolatum ointment and bandage applied. Specimen bottle labeled with patient information and site and specimen sent to [facility].      *new - last seen > 3 yrs ago

## 2021-05-13 NOTE — PATIENT INSTRUCTIONS
LaRoche Posay  Vanicream moisturizer        Biopsy Wound Care Instructions    · Keep the bandage in place for 24 hours. · Cleanse the wound with mild soapy water daily   Gently dry the area.  Apply Vaseline or petroleum jelly to the wound using a cotton tipped applicator.  Cover with a clean bandage.  Repeat this process until the biopsy site is healed.  If you had stitches placed, continue treating the site until the stitches are removed. Remember to make an appointment to return to have your stitches removed by our staff.  You may shower and bathe as usual.       ** Biopsy results generally take around 7 business days to come back. If you have not heard from us by then, please call the office at [phone]. *Please note that biopsy results are released to both the patient and physician at the same time in [employer]. Please allow time for your physician to review the results. One of our staff members will reach out to you with the results and plan.

## 2021-05-17 LAB — DERMATOLOGY PATHOLOGY REPORT: ABNORMAL

## 2021-05-18 ENCOUNTER — PATIENT MESSAGE (OUTPATIENT)
Dept: DERMATOLOGY | Age: 36
End: 2021-05-18

## 2021-05-18 NOTE — TELEPHONE ENCOUNTER
From: Gloria Bales  To: Reuben De La Torre MD  Sent: 5/18/2021 5:24 PM EDT  Subject: Test Results Question    Hello,  Is there follow-up needed for this?    Thanks,  Tee Santamaria    I have a question about HB resulted on 5/17/21, 11:05 AM.

## 2021-05-19 NOTE — TELEPHONE ENCOUNTER
From: Cesar Bales  To: Nu Foster MD  Sent: 5/18/2021 10:28 PM EDT  Subject: Prescription Question    Hello,  Sorry, I forgot to ask when I was on the phone with Quentin Levin today but, can I get the Altreno that Dr. Hood Hurtado gave me a sample of called into my pharmacy?    Thanks,  [facility]

## 2021-05-20 RX ORDER — TRETINOIN 0.5 MG/G
LOTION TOPICAL
Qty: 20 G | Refills: 2 | Status: SHIPPED | OUTPATIENT
Start: 2021-05-20

## 2021-07-08 ENCOUNTER — PROCEDURE VISIT (OUTPATIENT)
Dept: DERMATOLOGY | Age: 36
End: 2021-07-08
Payer: COMMERCIAL

## 2021-07-08 VITALS — TEMPERATURE: 98.2 F

## 2021-07-08 DIAGNOSIS — C44.519 BASAL CELL CARCINOMA (BCC) OF ANTERIOR CHEST: Primary | ICD-10-CM

## 2021-07-08 PROCEDURE — 12032 INTMD RPR S/A/T/EXT 2.6-7.5: CPT | Performed by: DERMATOLOGY

## 2021-07-08 PROCEDURE — 11601 EXC TR-EXT MAL+MARG 0.6-1 CM: CPT | Performed by: DERMATOLOGY

## 2021-07-08 NOTE — PROGRESS NOTES
[facility] Dermatology  Sukhjinder Ramirez MD  [phone]      Mango Torres  1985    37 y.o. female     Date of Visit: 2021    Chief Complaint: [facility]  Chief Complaint   Patient presents with    Basal Cell Carcinoma     chest     Last seen:     History of Present Illness:    1. Here for trx of [facility] on the L chest, bx'd at last visit and no probs since. No bleeding probs. No pacemaker/defib. *bx x 2 of irritated papule on the L side of the nose, bx ~ and 2016 - read as angiofibroma. No personal hx of skin cancer. Grandfather with hx of melanoma. Review of Systems:  Gen: Feels well, good sense of health. Past Medical History, Family History, Surgical History, Medications and Allergies reviewed. Past Medical History:   Diagnosis Date    Acne     Bicornate uterus     Hypothyroidism     Infertility, female     IUI    Migraines     PCOS (polycystic ovarian syndrome)     Uterine fibroid        Past Surgical History:   Procedure Laterality Date    BREAST ENHANCEMENT SURGERY      BREAST SURGERY  2010     SECTION N/A 2019    Primary  SECTION low transverse uterine incision at 1154 performed by Vicky Downey MD at [facility] L&D [address]      x 2 with diag lap    TONSILLECTOMY  2014    WISDOM TOOTH EXTRACTION Bilateral        Outpatient Medications Marked as Taking for the 21 encounter (Procedure visit) with Ben Buitrago MD   Medication Sig Dispense Refill    ALTRENO 0.05 % LOTN Apply small amount to the face nightly as directed. 20 g 2    spironolactone (ALDACTONE) 25 MG tablet One po daily.  30 tablet 3    levothyroxine (SYNTHROID) 88 MCG tablet TAKE 1 TABLET BY MOUTH EVERY MORNING BEFORE BREAKFAST 90 tablet 3    liothyronine (CYTOMEL) 5 MCG tablet TAKE 1 TABLET BY MOUTH EVERY DAY 90 tablet 3    VITAMIN D PO Take by mouth      Prenatal Vit-Fe Fumarate-FA (PRENATAL VITAMIN PO) Take 1 tablet by mouth daily         Allergies   Allergen Reactions    Percocet [Oxycodone-Acetaminophen]      Migraine         Physical Examination     Gen, well-appearing    L chest with subtle pink macule remaining                Photos in media.               Assessment and Plan      L chest - BCC  - excision today after procedure reviewed, alt trx options discussed and pt consented to trx  educ risk bleed, infxn, scar   area anesth with lido with epi and prepped in sterile manner   ellipse excised to superficial SQ with 2 mm margins - 9 mm  specimen to path   edges undermined and hemostasis achieved with electrodessication   wound closed with layered closure 4-0 deep vicryl sutures and dermabond  pressure dressing applied   pt educ re wnd care and suture removal     2.7 cm - final repair length    last seen > 3 yrs ago prior to bx - encouraged at last yearly f/u

## 2021-07-12 LAB — DERMATOLOGY PATHOLOGY REPORT: ABNORMAL

## 2021-08-18 ENCOUNTER — OFFICE VISIT (OUTPATIENT)
Dept: ENDOCRINOLOGY | Age: 36
End: 2021-08-18
Payer: COMMERCIAL

## 2021-08-18 ENCOUNTER — TELEPHONE (OUTPATIENT)
Dept: ENDOCRINOLOGY | Age: 36
End: 2021-08-18

## 2021-08-18 VITALS
HEIGHT: 64 IN | BODY MASS INDEX: 27.49 KG/M2 | OXYGEN SATURATION: 96 % | RESPIRATION RATE: 14 BRPM | WEIGHT: 161 LBS | DIASTOLIC BLOOD PRESSURE: 67 MMHG | HEART RATE: 68 BPM | TEMPERATURE: 98 F | SYSTOLIC BLOOD PRESSURE: 111 MMHG

## 2021-08-18 DIAGNOSIS — E03.9 ACQUIRED HYPOTHYROIDISM: Primary | ICD-10-CM

## 2021-08-18 PROCEDURE — G8427 DOCREV CUR MEDS BY ELIG CLIN: HCPCS | Performed by: INTERNAL MEDICINE

## 2021-08-18 PROCEDURE — 1036F TOBACCO NON-USER: CPT | Performed by: INTERNAL MEDICINE

## 2021-08-18 PROCEDURE — 99213 OFFICE O/P EST LOW 20 MIN: CPT | Performed by: INTERNAL MEDICINE

## 2021-08-18 PROCEDURE — G8419 CALC BMI OUT NRM PARAM NOF/U: HCPCS | Performed by: INTERNAL MEDICINE

## 2021-08-18 NOTE — PROGRESS NOTES
SUBJECTIVE:  Tahmina Watson is a 37 y.o. female who is here for hypothyroidism. 1. Acquired hypothyroidism    This started in . Patient was diagnosed with hypothyroidism. The problem has been unchanged. Patient started medication in . Currently patient is on: Synthroid, Cytomel. Misses  0 doses a month. Current complaints: dry skin, fatigue    History of obstructive symptoms: difficulty swallowing No, changes in voice/hoarseness No.  Had miscarriage in 2017. History of radiation to patient's neck: No  Resent iodine exposure: No  Family history includes no thyroid abnormalities. Family history of thyroid cancer: No      ULTRASOUND THYROID GLAND       HISTORY: Goiter, throat pain.       Real time sonographic imaging of the thyroid gland performed.       FINDINGS:       The right lobe measures 4.4 cm and left lobe measures 4.1 cm. The   isthmus is normal at 2 mm.       There are no colloid cysts or nodules present.           Impression   IMPRESSION:        Gland size within normal range.  No cysts or nodules.             Past Medical History:   Diagnosis Date    Acne     Bicornate uterus     Hypothyroidism     Infertility, female     IUI    Migraines     PCOS (polycystic ovarian syndrome)     Uterine fibroid      Patient Active Problem List    Diagnosis Date Noted    Left wrist pain 2020    Carpal instability 2020    S/P  section 04/15/2019    Post-op pain 04/15/2019    Bicornuate uterus 2019    Pregnancy 2018    History of miscarriage 2018    Hypothyroidism 2015    Endometriosis 2015    Uterine fibroid 2015    Acne     Uterine fibroid 2012     Past Surgical History:   Procedure Laterality Date    BREAST ENHANCEMENT SURGERY      BREAST SURGERY  2010     SECTION N/A 2019    Primary  SECTION low transverse uterine incision at 1154 performed by Vinicio Bell MD at [facility] L&D OR    DILATION AND CURETTAGE OF UTERUS      x 2 with diag lap    TONSILLECTOMY  2014    WISDOM TOOTH EXTRACTION Bilateral 2003     Family History   Problem Relation Age of Onset    High Cholesterol Mother     High Blood Pressure Father     Cancer Paternal Uncle         esophageal    Diabetes Paternal Grandmother     Atrial Fibrillation Paternal Grandmother     Heart Disease Paternal Grandmother     High Blood Pressure Paternal Grandmother     High Cholesterol Paternal Grandmother     High Cholesterol Maternal Grandfather     Prostate Cancer Maternal Grandfather      Social History     Socioeconomic History    Marital status:      Spouse name: None    Number of children: None    Years of education: None    Highest education level: None   Occupational History    None   Tobacco Use    Smoking status: Never Smoker    Smokeless tobacco: Never Used   Vaping Use    Vaping Use: Never used   Substance and Sexual Activity    Alcohol use: Not Currently     Comment: soccially    Drug use: No    Sexual activity: Yes     Partners: Male   Other Topics Concern    None   Social History Narrative    ** Merged History Encounter **          Social Determinants of Health     Financial Resource Strain:     Difficulty of Paying Living Expenses:    Food Insecurity:     Worried About Running Out of Food in the Last Year:     Ran Out of Food in the Last Year:    Transportation Needs:     Lack of Transportation (Medical):      Lack of Transportation (Non-Medical):    Physical Activity:     Days of Exercise per Week:     Minutes of Exercise per Session:    Stress:     Feeling of Stress :    Social Connections:     Frequency of Communication with Friends and Family:     Frequency of Social Gatherings with Friends and Family:     Attends Sabianism Services:     Active Member of Clubs or Organizations:     Attends Club or Organization Meetings:     Marital Status:    Intimate Partner Violence:     Fear of Current or Ex-Partner:     Emotionally Abused:     Physically Abused:     Sexually Abused:      Current Outpatient Medications   Medication Sig Dispense Refill    ALTRENO 0.05 % LOTN Apply small amount to the face nightly as directed. 20 g 2    levothyroxine (SYNTHROID) 88 MCG tablet TAKE 1 TABLET BY MOUTH EVERY MORNING BEFORE BREAKFAST 90 tablet 3    liothyronine (CYTOMEL) 5 MCG tablet TAKE 1 TABLET BY MOUTH EVERY DAY 90 tablet 3    VITAMIN D PO Take by mouth      Prenatal Vit-Fe Fumarate-FA (PRENATAL VITAMIN PO) Take 1 tablet by mouth daily      spironolactone (ALDACTONE) 25 MG tablet One po daily. (Patient not taking: Reported on 8/18/2021) 30 tablet 3     No current facility-administered medications for this visit.      Allergies   Allergen Reactions    Percocet [Oxycodone-Acetaminophen]      Migraine     Family Status   Relation Name Status    Mother  Alive    Father  [de-identified]    Sister  Alive    PUnc  (Not Specified)    PGM  (Not Specified)    MGF  (Not Specified)       Review of Systems:  Constitutional: has fatigue, no fever, no recent weight gain, no recent weight loss, no changes in appetite  Eyes: no eye pain, no change in vision, no eye redness, no eye irritation, no double vision  Ears, nose, throat: no nasal congestion, no sore throat, no earache, no decrease in hearing, no hoarseness, no dry mouth, no sinus problems, no difficulty swallowing, no neck lumps, no dental problems, no mouth sores, no ringing in ears  Pulmonary: no shortness of breath, no wheezing, no dyspnea on exertion, no cough  Cardiovascular: no chest pain, no lower extremity edema, no orthopnea, no intermittent leg claudication, no palpitations  Gastrointestinal: no abdominal pain, has nausea, has vomiting, no diarrhea, no constipation, no heartburn, no bloating  Genitourinary: no dysuria, no urinary incontinence, no urinary hesitancy, no change in urinary frequency, no feelings of urinary urgency, no nocturia  Musculoskeletal: no joint swelling, no joint stiffness, no joint pain, no muscle cramps, no muscle pain, no bone pain  Integument/Breast: no hair loss, no skin rashes, no skin lesions, no itching, has dry skin  Neurological: no numbness, no tingling, no weakness, no confusion, no headaches, no dizziness, no fainting, no tremors, no decrease in memory, no balance problems  Psychiatric: no anxiety, no depression, no insomnia  Hematologic/Lymphatic: no tendency for easy bleeding, no swollen lymph nodes, no tendency for easy bruising  Immunology: no seasonal allergies, no frequent infections, no frequent illnesses  Endocrine: no temperature intolerance, no hot flashes, no hand tremor    OBJECTIVE:   /67   Pulse 68   Temp 98 °F (36.7 °C)   Resp 14   Ht 5' 4\" (1.626 m)   Wt 161 lb (73 kg)   SpO2 96%   BMI 27.64 kg/m²   Wt Readings from Last 3 Encounters:   08/18/21 161 lb (73 kg)   09/21/20 155 lb (70.3 kg)   09/14/20 157 lb (71.2 kg)       Physical Exam:  Constitutional: no acute distress, well appearing, well nourished  Psychiatric: oriented to person, place and time, judgement, insight and normal, recent and remote memory and intact and mood, affect are normal  Skin: skin and subcutaneous tissue is normal without mass, normal turgor  Head and Face: examination of head and face revealed no abnormalities  Eyes: no lid or conjunctival swelling, no erythema or discharge, pupils are normal, equal, round, and reactive to light  Ears/Nose: external inspection of ears and nose revealed no abnormalities, hearing is grossly normal  Oropharynx/Mouth/Face: lips, tongue and gums are normal with no lesions, the voice quality was normal  Neck: neck is supple and symmetric, with midline trachea and no masses, thyroid is normal  Lymphatics: normal cervical lymph nodes, normal supraclavicular nodes  Pulmonary: no increased work of breathing or signs of respiratory distress, lungs are clear to auscultation  Cardiovascular: normal heart rate and rhythm, normal S1 and S2, no murmurs and pedal pulses and 2+ bilaterally, No edema  Abdomen: abdomen is soft, non-tender with no masses  Musculoskeletal: normal gait and station, exam of the digits and nails are normal  Neurological: normal coordination, normal general cortical function    Lab Review:  Lab Results   Component Value Date    TSH 0.71 08/10/2020     No results found for: FREET4     ASSESSMENT/PLAN:    1. Acquired hypothyroidism  Had labs done today  TSH 0.07-0.12-2.2-1.02-0.71  Feels well, no palpitations, anxiety, insomnia  Continue same dose, adjust next yoav if indicated. Cytomel 5 mcg  Synthroid 0.088 mg daily    - T4, Free; Future  - TSH without Reflex; Future        Reviewed and/or ordered clinical lab results Yes  Reviewed and/or ordered radiology tests Yes   Reviewed and/or ordered other diagnostic tests No  Discussed test results with performing physician No  Independently reviewed image, tracing, or specimen No  Made a decision to obtain old records No  Reviewed old records Yes  Obtained history from other than patient No    Fara Bales was counseled regarding symptoms of hypothyroidism diagnosis, course and complications of disease if inadequately treated, side effects of medications, diagnosis, treatment options, and prognosis, risks, benefits, complications, and alternatives of treatment, labs, imaging and other studies and treatment targets and goals, thyroid medication dose adjustments. She understands instructions and counseling. Total time I spent for this encounter 20 min      Return in about 1 year (around 8/18/2022) for thyroid problems.

## 2021-08-18 NOTE — TELEPHONE ENCOUNTER
[facility] called and said they mary ann blood but the orders that are in her chart are .   Please put new orders in

## 2021-08-21 DIAGNOSIS — E03.9 ACQUIRED HYPOTHYROIDISM: ICD-10-CM

## 2021-08-23 NOTE — TELEPHONE ENCOUNTER
Requested Prescriptions     Pending Prescriptions Disp Refills    liothyronine (CYTOMEL) 5 MCG tablet [Pharmacy Med Name: LIOTHYRONINE SOD 5 MCG TAB] 90 tablet 3     Sig: TAKE 1 TABLET BY MOUTH EVERY DAY    levothyroxine (SYNTHROID) 88 MCG tablet [Pharmacy Med Name: LEVOTHYROXINE 88 MCG TABLET] 90 tablet 3     Sig: TAKE 1 TABLET BY MOUTH EVERY DAY BEFORE BREAKFAST     Last OV 8/18/21  Next OV 8/10/22  Last refill   Last labs

## 2021-08-24 RX ORDER — LIOTHYRONINE SODIUM 5 UG/1
TABLET ORAL
Qty: 90 TABLET | Refills: 3 | Status: SHIPPED | OUTPATIENT
Start: 2021-08-24 | End: 2022-10-03

## 2021-08-24 RX ORDER — LEVOTHYROXINE SODIUM 88 UG/1
TABLET ORAL
Qty: 90 TABLET | Refills: 3 | Status: SHIPPED | OUTPATIENT
Start: 2021-08-24 | End: 2022-10-03

## 2021-09-27 ENCOUNTER — TELEPHONE (OUTPATIENT)
Dept: DERMATOLOGY | Age: 36
End: 2021-09-27

## 2021-09-27 NOTE — TELEPHONE ENCOUNTER
Patient has spot on right leg that she states looks like an ingrown hair. Patient is requesting a return call to schedule an appt. Nazario Addy currently has an appt scheduled for Jan. Please advise. Thank you!

## 2021-09-29 ENCOUNTER — PATIENT MESSAGE (OUTPATIENT)
Dept: DERMATOLOGY | Age: 36
End: 2021-09-29

## 2021-09-29 NOTE — TELEPHONE ENCOUNTER
From: Eric Bales  To: Fitz Matias MD  Sent: 9/29/2021 11:55 AM EDT  Subject: Visit Follow-Up Question    Demetrio Schwab,  Here is the spot on leg we discussed. Thanks!

## 2021-10-01 NOTE — TELEPHONE ENCOUNTER
I can't tell for sure what it is from the photo. OK to schedule to have this spot checked. Keep regular skin check for later this winter.

## 2021-10-12 ENCOUNTER — OFFICE VISIT (OUTPATIENT)
Dept: DERMATOLOGY | Age: 36
End: 2021-10-12
Payer: COMMERCIAL

## 2021-10-12 VITALS — TEMPERATURE: 97.8 F

## 2021-10-12 DIAGNOSIS — D23.9 DERMATOFIBROMA: Primary | ICD-10-CM

## 2021-10-12 PROCEDURE — G8419 CALC BMI OUT NRM PARAM NOF/U: HCPCS | Performed by: DERMATOLOGY

## 2021-10-12 PROCEDURE — 1036F TOBACCO NON-USER: CPT | Performed by: DERMATOLOGY

## 2021-10-12 PROCEDURE — G8427 DOCREV CUR MEDS BY ELIG CLIN: HCPCS | Performed by: DERMATOLOGY

## 2021-10-12 PROCEDURE — G8484 FLU IMMUNIZE NO ADMIN: HCPCS | Performed by: DERMATOLOGY

## 2021-10-12 PROCEDURE — 99212 OFFICE O/P EST SF 10 MIN: CPT | Performed by: DERMATOLOGY

## 2021-10-12 NOTE — PROGRESS NOTES
[facility] Dermatology  Josiane Page MD  [phone]      Lanre Iyer  1985    37 y.o. female     Date of Visit: 10/12/2021    Chief Complaint: lesion  Chief Complaint   Patient presents with    Skin Lesion     spot- right medial shin area     Last seen:  for FSE and  for [facility]    History of Present Illness:    Here for eval of a lesion on the R medial calf. Present for 1 year and no recent changes but she has noticed it more and is concerned could be skin cancer. Asx. [facility] on the L chest . No probs since excision. *bx x 2 of irritated papule on the L side of the nose, bx ~ and 2016 - read as angiofibroma. No personal hx of skin cancer. Grandfather with hx of melanoma. Review of Systems:  Gen: Feels well, good sense of health. Past Medical History, Family History, Surgical History, Medications and Allergies reviewed. Past Medical History:   Diagnosis Date    Acne     Bicornate uterus     Hypothyroidism     Infertility, female     IUI    Migraines     PCOS (polycystic ovarian syndrome)     Uterine fibroid        Past Surgical History:   Procedure Laterality Date    BREAST ENHANCEMENT SURGERY      BREAST SURGERY  2010     SECTION N/A 2019    Primary  SECTION low transverse uterine incision at 1154 performed by K[facility] Gibbons MD at [facility] L[facility]D OR    DILATION AND CURETTAGE OF UTERUS      x 2 with diag lap    TONSILLECTOMY  2014    WISDOM TOOTH EXTRACTION Bilateral        Outpatient Medications Marked as Taking for the 10/12/21 encounter (Office Visit) with Yaz Banerjee MD   Medication Sig Dispense Refill    liothyronine (CYTOMEL) 5 MCG tablet TAKE 1 TABLET BY MOUTH EVERY DAY 90 tablet 3    levothyroxine (SYNTHROID) 88 MCG tablet TAKE 1 TABLET BY MOUTH EVERY DAY BEFORE BREAKFAST 90 tablet 3    ALTRENO 0.05 % LOTN Apply small amount to the face nightly as directed.  20 g 2    spironolactone (ALDACTONE) 25 MG tablet One po daily. 30 tablet 3    VITAMIN D PO Take by mouth      Prenatal Vit-Fe Fumarate-FA (PRENATAL VITAMIN PO) Take 1 tablet by mouth daily         Allergies   Allergen Reactions    Percocet [Oxycodone-Acetaminophen]      Migraine         Physical Examination     Gen, well-appearing    R medial calf/shin with firm dermal pinkish-brown papule   L chest with linear mildly pink scar                Photos in media.               Assessment and Plan     DF - R calf/shin  - reassured regarding benign nature  - educ re si/sx/ABCD's of MM   educ sun protection - OTC sunscreen with SPF 30-50+    Hx of NMSC - healed well s/p excision of BCC    F/u winter/spring for FSE

## 2021-12-10 DIAGNOSIS — E03.9 ACQUIRED HYPOTHYROIDISM: ICD-10-CM

## 2021-12-10 LAB
T3 FREE: 3.4 PG/ML (ref 2.3–4.2)
T4 FREE: 1.4 NG/DL (ref 0.9–1.8)
TSH SERPL DL<=0.05 MIU/L-ACNC: 1.01 UIU/ML (ref 0.27–4.2)

## 2022-02-24 ENCOUNTER — OFFICE VISIT (OUTPATIENT)
Dept: FAMILY MEDICINE CLINIC | Age: 37
End: 2022-02-24
Payer: COMMERCIAL

## 2022-02-24 VITALS
DIASTOLIC BLOOD PRESSURE: 67 MMHG | BODY MASS INDEX: 27.11 KG/M2 | HEIGHT: 64 IN | SYSTOLIC BLOOD PRESSURE: 108 MMHG | OXYGEN SATURATION: 99 % | WEIGHT: 158.8 LBS | HEART RATE: 72 BPM

## 2022-02-24 DIAGNOSIS — Z00.00 PREVENTATIVE HEALTH CARE: Primary | ICD-10-CM

## 2022-02-24 PROCEDURE — 99385 PREV VISIT NEW AGE 18-39: CPT | Performed by: FAMILY MEDICINE

## 2022-02-24 PROCEDURE — G8484 FLU IMMUNIZE NO ADMIN: HCPCS | Performed by: FAMILY MEDICINE

## 2022-02-24 SDOH — HEALTH STABILITY: PHYSICAL HEALTH: ON AVERAGE, HOW MANY DAYS PER WEEK DO YOU ENGAGE IN MODERATE TO STRENUOUS EXERCISE (LIKE A BRISK WALK)?: 5 DAYS

## 2022-02-24 SDOH — HEALTH STABILITY: PHYSICAL HEALTH: ON AVERAGE, HOW MANY MINUTES DO YOU ENGAGE IN EXERCISE AT THIS LEVEL?: 20 MIN

## 2022-02-24 ASSESSMENT — PATIENT HEALTH QUESTIONNAIRE - PHQ9
SUM OF ALL RESPONSES TO PHQ QUESTIONS 1-9: 0
SUM OF ALL RESPONSES TO PHQ9 QUESTIONS 1 & 2: 0
SUM OF ALL RESPONSES TO PHQ QUESTIONS 1-9: 0
SUM OF ALL RESPONSES TO PHQ QUESTIONS 1-9: 0
1. LITTLE INTEREST OR PLEASURE IN DOING THINGS: 0
2. FEELING DOWN, DEPRESSED OR HOPELESS: 0
SUM OF ALL RESPONSES TO PHQ QUESTIONS 1-9: 0

## 2022-02-24 NOTE — PROGRESS NOTES
A/P:      Diagnosis Orders   1. Preventative health care  Comprehensive Metabolic Panel    CBC with Auto Differential    LIPID PANEL     Healthy living encouraged. Age appropriate health maintenance reviewed. I recommend that she get COVID vaccine 2 months after her positive test.      Follow-up in 12 months for physical or sooner if needed. O:   Vitals:    02/24/22 1316   BP: 108/67   Pulse: 72   SpO2: 99%     Gen- NAD, pleasant  HEENT- Eyes without icterus or injection, throat and tms unremarkable  Neck- Supple, no lymphadenopathy appreciated  Lungs- CTAB  Heart- RRR  Abd- Soft, non tender  Ext- No edema  Psych- Appropriate    S: CC-establish, preventative visit  HPI-Andreia presents to establish care and for a preventative exam.  She reports that she is doing well overall. She has a resolving sinus infection. She sees gynecology and will be making appointment for annual exam.  She sees endocrinology for hypothyroidism. She has no concerns today. SRIDHAR  Denies fever, chills, night sweats  Has mild headaches twice per month, this is much improved from when she was younger, denies sore throat, ear pain  Denies chest pain, dyspnea, palpitations  Denies nausea, vomiting, diarrhea  Denies joint pain  Denies depression, anxiety  Denies rashes    Current Outpatient Medications   Medication Sig Dispense Refill    liothyronine (CYTOMEL) 5 MCG tablet TAKE 1 TABLET BY MOUTH EVERY DAY 90 tablet 3    levothyroxine (SYNTHROID) 88 MCG tablet TAKE 1 TABLET BY MOUTH EVERY DAY BEFORE BREAKFAST 90 tablet 3    ALTRENO 0.05 % LOTN Apply small amount to the face nightly as directed. 20 g 2    VITAMIN D PO Take by mouth      Prenatal Vit-Fe Fumarate-FA (PRENATAL VITAMIN PO) Take 1 tablet by mouth daily       No current facility-administered medications for this visit.         Allergies   Allergen Reactions    Percocet [Oxycodone-Acetaminophen]      Migraine        Past Medical History:   Diagnosis Date    Acne     Bicornate uterus     COVID     Hypothyroidism     Infertility, female     IUI    Migraines     PCOS (polycystic ovarian syndrome)     Uterine fibroid         Past Surgical History:   Procedure Laterality Date    BREAST ENHANCEMENT SURGERY      BREAST SURGERY  2010     SECTION N/A 2019    Primary  SECTION low transverse uterine incision at 1154 performed by Juan Manuel Denson MD at [facility] L&D [address] OF UTERUS      x 2 with diag lap    TONSILLECTOMY  2014    WISDOM TOOTH EXTRACTION Bilateral         Social History     Social History Narrative    , feels safe, 1 child (almost 3 as of , Opal Parikh), works as nurse at [employer], likes to spend time with kid             Family History   Problem Relation Age of Onset    High Cholesterol Mother     High Blood Pressure Father     High Cholesterol Maternal Grandfather     Diabetes Paternal Grandmother     Atrial Fibrillation Paternal Grandmother     Heart Disease Paternal Grandmother     High Blood Pressure Paternal Grandmother     High Cholesterol Paternal Grandmother     Cancer Paternal Uncle         esophageal          [address], DO

## 2022-04-15 ENCOUNTER — PATIENT MESSAGE (OUTPATIENT)
Dept: FAMILY MEDICINE CLINIC | Age: 37
End: 2022-04-15

## 2022-04-15 RX ORDER — NITROFURANTOIN 25; 75 MG/1; MG/1
100 CAPSULE ORAL 2 TIMES DAILY
Qty: 10 CAPSULE | Refills: 0 | Status: SHIPPED | OUTPATIENT
Start: 2022-04-15 | End: 2022-04-20

## 2022-04-15 NOTE — TELEPHONE ENCOUNTER
Patient called in regards to message below. Patient was not able to come in for appointment due to work and cannot do VV due to living in [place] [address]. Per Dr Selina Santamaria: If she is not allergic, give her macrobid 100 mg BID for 5 days see the doctor next week if not better. Called and let patient know. She is not allergic to medications. Due to the nature of the encounter. Okay per Dr Selina Santamaria to send in medication as directed above.

## 2022-04-15 NOTE — TELEPHONE ENCOUNTER
From: Ju Manning  To: Dr. Sejal Jung  Sent: 4/15/2022 10:15 AM EDT  Subject: Dmitriy Nails, I have a uti. I am experiencing urgency and discomfort with urination. Can you call me in antibiotic? Thanks!

## 2022-04-28 ENCOUNTER — OFFICE VISIT (OUTPATIENT)
Dept: DERMATOLOGY | Age: 37
End: 2022-04-28
Payer: COMMERCIAL

## 2022-04-28 VITALS — TEMPERATURE: 97.2 F

## 2022-04-28 DIAGNOSIS — Z86.018 HISTORY OF DYSPLASTIC NEVUS: ICD-10-CM

## 2022-04-28 DIAGNOSIS — Z85.828 HISTORY OF NONMELANOMA SKIN CANCER: ICD-10-CM

## 2022-04-28 DIAGNOSIS — D48.5 NEOPLASM OF UNCERTAIN BEHAVIOR OF SKIN: ICD-10-CM

## 2022-04-28 DIAGNOSIS — D22.9 MULTIPLE NEVI: ICD-10-CM

## 2022-04-28 DIAGNOSIS — D22.39 FIBROUS PAPULE OF NOSE: ICD-10-CM

## 2022-04-28 DIAGNOSIS — L81.4 LENTIGINES: ICD-10-CM

## 2022-04-28 DIAGNOSIS — L70.0 ACNE VULGARIS: Primary | ICD-10-CM

## 2022-04-28 DIAGNOSIS — L91.8 INFLAMED SKIN TAG: ICD-10-CM

## 2022-04-28 PROCEDURE — 99214 OFFICE O/P EST MOD 30 MIN: CPT | Performed by: DERMATOLOGY

## 2022-04-28 PROCEDURE — 1036F TOBACCO NON-USER: CPT | Performed by: DERMATOLOGY

## 2022-04-28 PROCEDURE — 11102 TANGNTL BX SKIN SINGLE LES: CPT | Performed by: DERMATOLOGY

## 2022-04-28 PROCEDURE — G8419 CALC BMI OUT NRM PARAM NOF/U: HCPCS | Performed by: DERMATOLOGY

## 2022-04-28 PROCEDURE — G8427 DOCREV CUR MEDS BY ELIG CLIN: HCPCS | Performed by: DERMATOLOGY

## 2022-04-28 PROCEDURE — 11103 TANGNTL BX SKIN EA SEP/ADDL: CPT | Performed by: DERMATOLOGY

## 2022-04-28 PROCEDURE — 11200 RMVL SKIN TAGS UP TO&INC 15: CPT | Performed by: DERMATOLOGY

## 2022-04-28 RX ORDER — CLINDAMYCIN PHOSPHATE 10 UG/ML
LOTION TOPICAL
Qty: 60 ML | Refills: 4 | Status: SHIPPED | OUTPATIENT
Start: 2022-04-28

## 2022-04-28 NOTE — PROGRESS NOTES
[facility]  Baron David MD  [phone]      Rachna Montano  1985    31 y.o. female     Date of Visit: 4/28/2022    Chief Complaint: f/u moles, acne  Chief Complaint   Patient presents with    Skin Exam     6 mo FSE          Acne     face, chest     Last seen:     History of Present Illness:    1. F/u for acne. C/o still flaring - mainly on the chest/back currently. Tried spirono in the past - felt that she was too dry with it so she stopped it shortly after starting. Resumed at last visit but no longer taking. C/o dryness with most treatments. *No plans for more pregnancy but not on contraception. Had IUI to get pregnant. *    She was started on atralin, aczone and spirono 50 mg bid at previous visit. She previously reported that she started getting acne as a child (as early as third grade). Was on isotretinoin x several courses in the past with clearance and then flaring again after completing treatment. Had irritation and poorly tolerated multiple topicals in the past (tretinoin, BP/clinda products). She has tried oral antibiotics in the past without clearance. She was on OCP's; had IUD placed prior to pregnancy. 2, 3. She has multiple moles on the trunk and extremities, none of which are changing in size, shape or color. They're all asymptomatic. She has a family history of melanoma. At past visit: Bx of the Lower back - right of midline-mildly dysplastic mole but completely removed with bx. Rt lower back-benign nevus. She wears sunscreen regularly and denies tanning bed use. 4. FP's on the nose - improved with ED. Requests rpt trx.    5. F/u s/p excision of BCC on the chest in . No probs since. 6. She has a few concerning pigmented lesions  R FA - r /o dysplastic  R buttock - r/o dysplastic    7. She has 2 irritated tags on the back.     *bx x 2 of irritated papule on the L side of the nose, bx ~2015 and 2016 - read as angiofibroma. No personal hx of skin cancer. Grandfather with hx of melanoma. Review of Systems:  Gen: Feels well, good sense of health. Skin: No changing moles or lesions. No new rashes. Past Medical History, Family History, Surgical History, Medications and Allergies reviewed. Past Medical History:   Diagnosis Date    Acne     Bicornate uterus     COVID     Hypothyroidism     Infertility, female     IUI    Migraines     PCOS (polycystic ovarian syndrome)     Uterine fibroid        Past Surgical History:   Procedure Laterality Date    BREAST ENHANCEMENT SURGERY      BREAST SURGERY  2010     SECTION N/A 2019    Primary  SECTION low transverse uterine incision at 1154 performed by Lee Treviño [facility] at [facility] L[facility] OR    DILATION AND CURETTAGE OF UTERUS      x 2 with diag lap    TONSILLECTOMY  2014    WISDOM TOOTH EXTRACTION Bilateral        Outpatient Medications Marked as Taking for the 22 encounter (Office Visit) with Onel Orr MD   Medication Sig Dispense Refill    liothyronine (CYTOMEL) 5 MCG tablet TAKE 1 TABLET BY MOUTH EVERY DAY 90 tablet 3    levothyroxine (SYNTHROID) 88 MCG tablet TAKE 1 TABLET BY MOUTH EVERY DAY BEFORE BREAKFAST 90 tablet 3    ALTRENO 0.05 % LOTN Apply small amount to the face nightly as directed.  20 g 2    VITAMIN D PO Take by mouth      Prenatal Vit-Fe Fumarate-FA (PRENATAL VITAMIN PO) Take 1 tablet by mouth daily         Allergies   Allergen Reactions    Percocet [Oxycodone-Acetaminophen]      Migraine         Physical Examination     Gen, well-appearing  FSE today    Cheeks and chin with small scattered erythematous papules and chest> back with multiple erythematous macules and papules  Lower back scars clear  trunk and extremities with numerous brown macules and papules, many of which are very dark brown  Nose with two 1 mm pinkish dome-shaped papules  L chest with scar - clear  R FA and R buttock with irregular dark brown macules  Back with 2 skin-colored peduncualtred soft papules                Photos in media. Assessment and Plan     1. Acne, mod inflammatory, hormonal flares previously - chest is worse  - start BP wash; ed bleaching, irritation  - start clinda lotion - face and chest    2. Benign-appearing nevi and lentigines  3. hx of dysplastic nevus; family history of melanoma  - educ re si/sx/ABCD's of MM   educ sun protection - OTC sunscreen with SPF 30-50+ recommended and reviewed usage  encouraged skin check yearly (sooner if indicated), self checks    4. FP on the nose x 2  - light ED - no extra charge  - ed shave bx/removal if growing, bleeding, sx    5. L chest - BCC - excision 7-2021  -no signs recurrence  - educ re si/sx/ABCD's of MM   educ sun protection - OTC sunscreen with SPF 30-50+ recommended and reviewed usage  encouraged skin check yearly (sooner if indicated), self checks    6. R FA - r /o dysplastic  R buttock - r/o dysplastic  - 2 Shave biopsy performed after verbal consent obtained. Patient educated regarding risk of bleeding, infection, scar and educated on wound care. Skin cleansed with alcohol pad and site anesthetized with lido + epi. Aluminum chloride applied to site for hemostasis. Petrolatum ointment and bandage applied. Specimen bottle labeled with patient information and site and specimen sent to [facility]. 7. Inflamed tags- back  - snip removal of 2 tags on the back  -  performed after verbal consent obtained. Patient educated regarding risk of bleeding, infection, scar and educated on wound care. Skin cleansed with alcohol pad and site anesthetized with lido + epi. Aluminum chloride applied to site for hemostasis. Petrolatum ointment and bandage applied.

## 2022-04-28 NOTE — PATIENT INSTRUCTIONS
Biopsy Wound Care Instructions    · Keep the bandage in place for 24 hours. · Cleanse the wound with mild soapy water daily   Gently dry the area.  Apply Vaseline or petroleum jelly to the wound using a cotton tipped applicator.  Cover with a clean bandage.  Repeat this process until the biopsy site is healed.  If you had stitches placed, continue treating the site until the stitches are removed. Remember to make an appointment to return to have your stitches removed by our staff.  You may shower and bathe as usual.       ** Biopsy results generally take around 7 business days to come back. If you have not heard from us by then, please call the office at [phone]. *Please note that biopsy results are released to both the patient and physician at the same time in [address] Please allow time for your physician to review the results. One of our staff members will reach out to you with the results and plan.

## 2022-05-03 LAB — DERMATOLOGY PATHOLOGY REPORT: NORMAL

## 2022-06-13 LAB
A/G RATIO: 2 (ref 1.1–2.2)
ALBUMIN SERPL-MCNC: 4.3 G/DL (ref 3.4–5)
ALP BLD-CCNC: 69 U/L (ref 40–129)
ALT SERPL-CCNC: 14 U/L (ref 10–40)
ANION GAP SERPL CALCULATED.3IONS-SCNC: 10 MMOL/L (ref 3–16)
AST SERPL-CCNC: 17 U/L (ref 15–37)
BASOPHILS ABSOLUTE: 0 K/UL (ref 0–0.2)
BASOPHILS RELATIVE PERCENT: 0.8 %
BILIRUB SERPL-MCNC: <0.2 MG/DL (ref 0–1)
BUN BLDV-MCNC: 13 MG/DL (ref 7–20)
CALCIUM SERPL-MCNC: 9.3 MG/DL (ref 8.3–10.6)
CHLORIDE BLD-SCNC: 102 MMOL/L (ref 99–110)
CHOLESTEROL, TOTAL: 164 MG/DL (ref 0–199)
CO2: 25 MMOL/L (ref 21–32)
CREAT SERPL-MCNC: 0.9 MG/DL (ref 0.6–1.1)
EOSINOPHILS ABSOLUTE: 0.1 K/UL (ref 0–0.6)
EOSINOPHILS RELATIVE PERCENT: 1.7 %
GFR AFRICAN AMERICAN: >60
GFR NON-AFRICAN AMERICAN: >60
GLUCOSE BLD-MCNC: 85 MG/DL (ref 70–99)
HCT VFR BLD CALC: 38.8 % (ref 36–48)
HDLC SERPL-MCNC: 61 MG/DL (ref 40–60)
HEMOGLOBIN: 13.5 G/DL (ref 12–16)
LDL CHOLESTEROL CALCULATED: 90 MG/DL
LYMPHOCYTES ABSOLUTE: 1.5 K/UL (ref 1–5.1)
LYMPHOCYTES RELATIVE PERCENT: 35.1 %
MCH RBC QN AUTO: 31.4 PG (ref 26–34)
MCHC RBC AUTO-ENTMCNC: 34.9 G/DL (ref 31–36)
MCV RBC AUTO: 90 FL (ref 80–100)
MONOCYTES ABSOLUTE: 0.3 K/UL (ref 0–1.3)
MONOCYTES RELATIVE PERCENT: 7.9 %
NEUTROPHILS ABSOLUTE: 2.3 K/UL (ref 1.7–7.7)
NEUTROPHILS RELATIVE PERCENT: 54.5 %
PDW BLD-RTO: 13.2 % (ref 12.4–15.4)
PLATELET # BLD: 200 K/UL (ref 135–450)
PMV BLD AUTO: 9 FL (ref 5–10.5)
POTASSIUM SERPL-SCNC: 4.6 MMOL/L (ref 3.5–5.1)
RBC # BLD: 4.31 M/UL (ref 4–5.2)
SODIUM BLD-SCNC: 137 MMOL/L (ref 136–145)
TOTAL PROTEIN: 6.5 G/DL (ref 6.4–8.2)
TRIGL SERPL-MCNC: 65 MG/DL (ref 0–150)
VLDLC SERPL CALC-MCNC: 13 MG/DL
WBC # BLD: 4.2 K/UL (ref 4–11)

## 2022-08-08 ENCOUNTER — TELEPHONE (OUTPATIENT)
Dept: ENDOCRINOLOGY | Age: 37
End: 2022-08-08

## 2022-08-08 DIAGNOSIS — E03.9 ACQUIRED HYPOTHYROIDISM: Primary | ICD-10-CM

## 2022-08-08 NOTE — TELEPHONE ENCOUNTER
Pt calling and canceled her upcoming appt. She rescheduled in November but her labs will . Can you please place new lab orders?  Thanks

## 2022-10-02 DIAGNOSIS — E03.9 ACQUIRED HYPOTHYROIDISM: ICD-10-CM

## 2022-10-03 RX ORDER — LEVOTHYROXINE SODIUM 88 UG/1
TABLET ORAL
Qty: 90 TABLET | Refills: 0 | Status: SHIPPED | OUTPATIENT
Start: 2022-10-03

## 2022-10-03 RX ORDER — LIOTHYRONINE SODIUM 5 UG/1
TABLET ORAL
Qty: 90 TABLET | Refills: 0 | Status: SHIPPED | OUTPATIENT
Start: 2022-10-03

## 2023-01-06 DIAGNOSIS — E03.9 ACQUIRED HYPOTHYROIDISM: ICD-10-CM

## 2023-01-06 RX ORDER — LEVOTHYROXINE SODIUM 88 UG/1
TABLET ORAL
Qty: 90 TABLET | Refills: 0 | OUTPATIENT
Start: 2023-01-06

## 2023-01-06 RX ORDER — LIOTHYRONINE SODIUM 5 UG/1
TABLET ORAL
Qty: 90 TABLET | Refills: 0 | OUTPATIENT
Start: 2023-01-06

## 2023-01-19 ENCOUNTER — TELEPHONE (OUTPATIENT)
Dept: ENDOCRINOLOGY | Age: 38
End: 2023-01-19

## 2023-01-19 DIAGNOSIS — E03.9 ACQUIRED HYPOTHYROIDISM: ICD-10-CM

## 2023-01-19 NOTE — TELEPHONE ENCOUNTER
Please advise. Pt was last seen on 8/18/2021. She was supposed to be seen 1 year later for thyroid annual    Pt cx 8/10/22 appt and NS on 11/30/22.

## 2023-01-19 NOTE — TELEPHONE ENCOUNTER
Patient needs a refill of her liothyronine (CYTOMEL) 5 MCG tablet and levothyroxine (SYNTHROID) 88 MCG tablet           [facility]/pharmacy [id number] - [address] [facility] [place] [phone] - F [phone]   [address] [place] [address]   Phone:  [phone]  Fax:  [phone]

## 2023-01-20 RX ORDER — LEVOTHYROXINE SODIUM 88 UG/1
TABLET ORAL
Qty: 30 TABLET | Refills: 0 | Status: SHIPPED | OUTPATIENT
Start: 2023-01-20

## 2023-01-20 RX ORDER — LIOTHYRONINE SODIUM 5 UG/1
TABLET ORAL
Qty: 30 TABLET | Refills: 0 | Status: SHIPPED | OUTPATIENT
Start: 2023-01-20

## 2023-01-20 NOTE — TELEPHONE ENCOUNTER
Last seen in the office 8/18/2021. Please inform patient that I will send prescriptions for 1 month only, no refills. Will be no more refills until seen in the office. Patient is welcome to call for cancellations. The other option is to continue thyroid monitoring with family doctor.

## 2024-10-31 ENCOUNTER — OFFICE VISIT (OUTPATIENT)
Dept: FAMILY MEDICINE CLINIC | Age: 39
End: 2024-10-31

## 2024-10-31 VITALS
WEIGHT: 171.4 LBS | DIASTOLIC BLOOD PRESSURE: 66 MMHG | SYSTOLIC BLOOD PRESSURE: 98 MMHG | HEART RATE: 66 BPM | BODY MASS INDEX: 29.26 KG/M2 | HEIGHT: 64 IN | OXYGEN SATURATION: 98 %

## 2024-10-31 DIAGNOSIS — Z00.00 PREVENTATIVE HEALTH CARE: Primary | ICD-10-CM

## 2024-10-31 DIAGNOSIS — L70.9 ACNE, UNSPECIFIED ACNE TYPE: ICD-10-CM

## 2024-10-31 DIAGNOSIS — E03.9 ACQUIRED HYPOTHYROIDISM: ICD-10-CM

## 2024-10-31 LAB
BASOPHILS # BLD: 0.1 K/UL (ref 0–0.2)
BASOPHILS NFR BLD: 0.8 %
DEPRECATED RDW RBC AUTO: 13.4 % (ref 12.4–15.4)
EOSINOPHIL # BLD: 0.1 K/UL (ref 0–0.6)
EOSINOPHIL NFR BLD: 1.2 %
HCT VFR BLD AUTO: 45.5 % (ref 36–48)
HGB BLD-MCNC: 15.3 G/DL (ref 12–16)
LYMPHOCYTES # BLD: 1.3 K/UL (ref 1–5.1)
LYMPHOCYTES NFR BLD: 20 %
MCH RBC QN AUTO: 30.6 PG (ref 26–34)
MCHC RBC AUTO-ENTMCNC: 33.5 G/DL (ref 31–36)
MCV RBC AUTO: 91.4 FL (ref 80–100)
MONOCYTES # BLD: 0.4 K/UL (ref 0–1.3)
MONOCYTES NFR BLD: 6.1 %
NEUTROPHILS # BLD: 4.8 K/UL (ref 1.7–7.7)
NEUTROPHILS NFR BLD: 71.9 %
PLATELET # BLD AUTO: 247 K/UL (ref 135–450)
PMV BLD AUTO: 9.5 FL (ref 5–10.5)
RBC # BLD AUTO: 4.98 M/UL (ref 4–5.2)
WBC # BLD AUTO: 6.7 K/UL (ref 4–11)

## 2024-10-31 RX ORDER — TESTOSTERONE 200 MG
200 PELLET (EA) IMPLANTATION
COMMUNITY

## 2024-10-31 RX ORDER — PROGESTERONE 200 MG/1
200 CAPSULE ORAL DAILY
COMMUNITY
Start: 2024-09-26

## 2024-10-31 RX ORDER — LEVOTHYROXINE, LIOTHYRONINE 38; 9 UG/1; UG/1
60 TABLET ORAL EVERY MORNING
COMMUNITY
Start: 2024-09-26

## 2024-10-31 RX ORDER — TRETINOIN 0.5 MG/G
LOTION TOPICAL
Qty: 20 G | Refills: 11 | Status: SHIPPED | OUTPATIENT
Start: 2024-10-31

## 2024-10-31 SDOH — ECONOMIC STABILITY: FOOD INSECURITY: WITHIN THE PAST 12 MONTHS, THE FOOD YOU BOUGHT JUST DIDN'T LAST AND YOU DIDN'T HAVE MONEY TO GET MORE.: NEVER TRUE

## 2024-10-31 SDOH — ECONOMIC STABILITY: INCOME INSECURITY: HOW HARD IS IT FOR YOU TO PAY FOR THE VERY BASICS LIKE FOOD, HOUSING, MEDICAL CARE, AND HEATING?: NOT HARD AT ALL

## 2024-10-31 SDOH — ECONOMIC STABILITY: FOOD INSECURITY: WITHIN THE PAST 12 MONTHS, YOU WORRIED THAT YOUR FOOD WOULD RUN OUT BEFORE YOU GOT MONEY TO BUY MORE.: NEVER TRUE

## 2024-10-31 ASSESSMENT — PATIENT HEALTH QUESTIONNAIRE - PHQ9
SUM OF ALL RESPONSES TO PHQ9 QUESTIONS 1 & 2: 0
SUM OF ALL RESPONSES TO PHQ QUESTIONS 1-9: 0
2. FEELING DOWN, DEPRESSED OR HOPELESS: NOT AT ALL
SUM OF ALL RESPONSES TO PHQ QUESTIONS 1-9: 0
SUM OF ALL RESPONSES TO PHQ QUESTIONS 1-9: 0
1. LITTLE INTEREST OR PLEASURE IN DOING THINGS: NOT AT ALL
SUM OF ALL RESPONSES TO PHQ QUESTIONS 1-9: 0

## 2024-10-31 NOTE — PROGRESS NOTES
A/P:    Diagnosis Orders   1. Preventative health care        2. Acquired hypothyroidism  T4, Free    T3, Free      3. Acne, unspecified acne type  ALTRENO 0.05 % LOTN          Healthy living encouraged, anticipatory guidance provided    Follow-up in 1 year or sooner if needed    O:   Vitals:    10/31/24 0950   BP: 98/66   Pulse: 66   SpO2: 98%     Gen- NAD, pleasant  HEENT- Eyes without icterus or injection, throat and tms unremarkable  Neck- Supple, no lymphadenopathy appreciated  Lungs- CTAB  Heart- RRR  Abd- Soft, non tender  Ext- No edema  Psych- Appropriate    S: CC-preventative visit  HPI-Andreia presents for physical.  She has no concerns.  She will be scheduling follow-up gynecology visit.  She will be getting flu shot through work.  She does see a alternative med doctor for her hypothyroidism.  She is on both Bentley and Cytomel.  She would like to get her levels checked today.  She does well on Altreno for adult acne.    ROS  Denies fever, chills, night sweats  Denies headaches, sore throat, ear pain  Denies chest pain, dyspnea, palpitations  Denies nausea, vomiting, diarrhea  Denies joint pain  Denies depression, anxiety  Denies urinary symptoms  Denies rashes    Current Outpatient Medications   Medication Sig Dispense Refill    NP THYROID 60 MG tablet Take 1 tablet by mouth every morning      Prasterone, DHEA, (DHEA PO) Take 5 mg by mouth daily      progesterone (PROMETRIUM) 200 MG CAPS capsule Take 1 capsule by mouth daily      Testosterone 200 MG PLLT 200 mg by Implant route every 3 months. Max Daily Amount: 200 mg      ALTRENO 0.05 % LOTN Apply small amount to the face nightly as directed. 20 g 11    liothyronine (CYTOMEL) 5 MCG tablet TAKE 1 TABLET BY MOUTH EVERY DAY 30 tablet 0    VITAMIN D PO Take by mouth       No current facility-administered medications for this visit.        Allergies   Allergen Reactions    Percocet [Oxycodone-Acetaminophen]      Migraine        Past Medical History:

## 2024-11-01 LAB
ALBUMIN SERPL-MCNC: 4.5 G/DL (ref 3.4–5)
ALBUMIN/GLOB SERPL: 2 {RATIO} (ref 1.1–2.2)
ALP SERPL-CCNC: 78 U/L (ref 40–129)
ALT SERPL-CCNC: 15 U/L (ref 10–40)
ANION GAP SERPL CALCULATED.3IONS-SCNC: 14 MMOL/L (ref 3–16)
AST SERPL-CCNC: 17 U/L (ref 15–37)
BILIRUB SERPL-MCNC: 0.4 MG/DL (ref 0–1)
BUN SERPL-MCNC: 11 MG/DL (ref 7–20)
CALCIUM SERPL-MCNC: 9.6 MG/DL (ref 8.3–10.6)
CHLORIDE SERPL-SCNC: 102 MMOL/L (ref 99–110)
CHOLEST SERPL-MCNC: 193 MG/DL (ref 0–199)
CO2 SERPL-SCNC: 22 MMOL/L (ref 21–32)
CREAT SERPL-MCNC: 0.9 MG/DL (ref 0.6–1.1)
GFR SERPLBLD CREATININE-BSD FMLA CKD-EPI: 85 ML/MIN/{1.73_M2}
GLUCOSE SERPL-MCNC: 84 MG/DL (ref 70–99)
HDLC SERPL-MCNC: 52 MG/DL (ref 40–60)
LDLC SERPL CALC-MCNC: 128 MG/DL
POTASSIUM SERPL-SCNC: 4.5 MMOL/L (ref 3.5–5.1)
PROT SERPL-MCNC: 6.8 G/DL (ref 6.4–8.2)
SODIUM SERPL-SCNC: 138 MMOL/L (ref 136–145)
T3FREE SERPL-MCNC: 5.6 PG/ML (ref 2.3–4.2)
T4 FREE SERPL-MCNC: 1 NG/DL (ref 0.9–1.8)
TRIGL SERPL-MCNC: 64 MG/DL (ref 0–150)
VLDLC SERPL CALC-MCNC: 13 MG/DL

## 2025-06-23 LAB
CHOLEST SERPL-MCNC: 173 MG/DL (ref 0–199)
GLUCOSE SERPL-MCNC: 87 MG/DL (ref 70–99)
HDLC SERPL-MCNC: 49 MG/DL (ref 40–60)
LDLC SERPL CALC-MCNC: 101 MG/DL
TRIGL SERPL-MCNC: 116 MG/DL (ref 0–150)

## (undated) DEVICE — CHLORAPREP 26ML ORANGE

## (undated) DEVICE — BAG,SPONGE COUNTER,BLUE,50/BX,5BX/CS: Brand: MEDLINE

## (undated) DEVICE — Device

## (undated) DEVICE — CANISTER, RIGID, 3000CC: Brand: MEDLINE INDUSTRIES, INC.

## (undated) DEVICE — COVER LT HNDL BLU PLAS

## (undated) DEVICE — 9165 UNIVERSAL PATIENT PLATE: Brand: 3M™

## (undated) DEVICE — POOLE SUCTION INSTRUMENT,RIGID: Brand: ARGYLE

## (undated) DEVICE — SAFESECURE,SECUREMENT,FOLEY CATH,STERILE: Brand: MEDLINE

## (undated) DEVICE — SUTURE MCRYL SZ 0 L36IN ABSRB UD L36MM CT-1 1/2 CIR Y946H

## (undated) DEVICE — SUTURE VCRL SZ 3-0 L36IN ABSRB UD L36MM CT-1 1/2 CIR J944H

## (undated) DEVICE — GLOVE SURG SZ 7 L12IN FNGR THK87MIL WHT LTX FREE

## (undated) DEVICE — SUTURE VCRL SZ 0 L36IN ABSRB UD CT-1 L36MM 1/2 CIR TAPR PNT VCP946H

## (undated) DEVICE — SPONGES GAUZE X-RAY 4X4 16PLY

## (undated) DEVICE — GARMENT,MEDLINE,DVT,INT,CALF,MED, GEN2: Brand: MEDLINE

## (undated) DEVICE — LARGE, DISPOSABLE ALEXIS O C-SECTION PROTECTOR - RETRACTOR: Brand: ALEXIS ® O C-SECTION PROTECTOR - RETRACTOR

## (undated) DEVICE — CATHETER TRAY 16 FR 5 CC FOL ANTIREFLX SAMPLING PRT DOVER

## (undated) DEVICE — SPONGE LAP W18XL18IN WHT COT 4 PLY FLD STRUNG RADPQ DISP ST

## (undated) DEVICE — SOLUTION IV IRRIG POUR BRL 0.9% SODIUM CHL 2F7124

## (undated) DEVICE — 3M™ MEDIPORE™ H SOFT CLOTH SURGICAL TAPE 2864, 4 INCH X 10 YARD (10CM X 9,14M), 12 ROLLS/CASE: Brand: 3M™ MEDIPORE™